# Patient Record
Sex: MALE | URBAN - METROPOLITAN AREA
[De-identification: names, ages, dates, MRNs, and addresses within clinical notes are randomized per-mention and may not be internally consistent; named-entity substitution may affect disease eponyms.]

---

## 2019-08-16 ENCOUNTER — HOSPITAL ENCOUNTER (INPATIENT)
Age: 24
LOS: 3 days | Discharge: HOME | DRG: 750 | End: 2019-08-19
Attending: PSYCHIATRY & NEUROLOGY | Admitting: PSYCHIATRY & NEUROLOGY
Payer: MEDICAID

## 2019-08-16 PROBLEM — F25.9 SCHIZOAFFECTIVE DISORDER (HCC): Status: ACTIVE | Noted: 2019-08-16

## 2019-08-16 PROCEDURE — 1240000000 HC EMOTIONAL WELLNESS R&B

## 2019-08-16 PROCEDURE — 90792 PSYCH DIAG EVAL W/MED SRVCS: CPT | Performed by: NURSE PRACTITIONER

## 2019-08-16 RX ORDER — MAGNESIUM HYDROXIDE/ALUMINUM HYDROXICE/SIMETHICONE 120; 1200; 1200 MG/30ML; MG/30ML; MG/30ML
30 SUSPENSION ORAL EVERY 6 HOURS PRN
Status: DISCONTINUED | OUTPATIENT
Start: 2019-08-16 | End: 2019-08-19 | Stop reason: HOSPADM

## 2019-08-16 RX ORDER — ARIPIPRAZOLE 20 MG/1
20 TABLET ORAL DAILY
Status: DISCONTINUED | OUTPATIENT
Start: 2019-08-16 | End: 2019-08-19 | Stop reason: HOSPADM

## 2019-08-16 RX ORDER — TRAZODONE HYDROCHLORIDE 50 MG/1
50 TABLET ORAL NIGHTLY PRN
Status: DISCONTINUED | OUTPATIENT
Start: 2019-08-16 | End: 2019-08-19 | Stop reason: HOSPADM

## 2019-08-16 RX ORDER — ACETAMINOPHEN 325 MG/1
650 TABLET ORAL EVERY 4 HOURS PRN
Status: DISCONTINUED | OUTPATIENT
Start: 2019-08-16 | End: 2019-08-19 | Stop reason: HOSPADM

## 2019-08-16 RX ORDER — BENZTROPINE MESYLATE 1 MG/ML
2 INJECTION INTRAMUSCULAR; INTRAVENOUS 2 TIMES DAILY PRN
Status: DISCONTINUED | OUTPATIENT
Start: 2019-08-16 | End: 2019-08-19 | Stop reason: HOSPADM

## 2019-08-16 ASSESSMENT — LIFESTYLE VARIABLES
HISTORY_ALCOHOL_USE: YES
HISTORY_ALCOHOL_USE: YES

## 2019-08-16 ASSESSMENT — PAIN SCALES - GENERAL: PAINLEVEL_OUTOF10: 0

## 2019-08-16 ASSESSMENT — SLEEP AND FATIGUE QUESTIONNAIRES
DO YOU USE A SLEEP AID: NO
DO YOU HAVE DIFFICULTY SLEEPING: NO
AVERAGE NUMBER OF SLEEP HOURS: 6

## 2019-08-16 ASSESSMENT — PATIENT HEALTH QUESTIONNAIRE - PHQ9: SUM OF ALL RESPONSES TO PHQ QUESTIONS 1-9: 20

## 2019-08-16 NOTE — GROUP NOTE
Group Therapy Note    Date: August 16    Group Start Time: 1330  Group End Time: 8594  Group Topic: Psychoeducation    MARIA R Jacobson, CTRS        Group Therapy Note    Attendees: 9         Patient's Goal:  To increase interpersonal interaction     Notes:  Patient attended group and participated     Status After Intervention:  Improved    Participation Level:  Active Listener and Interactive    Participation Quality: Appropriate, Attentive and Sharing      Speech:  normal      Thought Process/Content: Logical      Affective Functioning: Congruent      Mood: euthymic      Level of consciousness:  Alert and Oriented x4      Response to Learning: Progressing to goal      Endings: None Reported    Modes of Intervention: Education, Support and Socialization      Discipline Responsible: Psychoeducational Specialist      Signature:  Hanna Ovalles

## 2019-08-16 NOTE — PROGRESS NOTES
Patient given tobacco quitline number 32825177107 at this time, refusing to call at this time, states \" I just dont want to quit now\"- patient given information as to the dangers of long term tobacco use. Continue to reinforce the importance of tobacco cessation.

## 2019-08-16 NOTE — PLAN OF CARE
[facility]  Initial Interdisciplinary Treatment Plan NOTE    Original treatment plan Date & Time: 8/16/19 8492    Admission Type:  Admission Type: Involuntary    Reason for admission:   Reason for Admission: Suicidal ideations with multiple plans; history of attempts. Off medications and experiencing auditory hallucinations. Recent loss of mother    Estimated Length of Stay:  5-7days  Estimated Discharge Date:   to be determined by physician    PATIENT STRENGTHS:  Patient Strengths:Strengths: No significant Physical Illness  Patient Strengths and Limitations:Limitations: General negative or hopeless attitude about future/recovery  Addictive Behavior: Addictive Behavior  In the past 3 months, have you felt or has someone told you that you have a problem with:  : None  Do you have a history of Chemical Use?: No  Do you have a history of Alcohol Use?: Yes  Do you have a history of Street Drug Abuse?: (denies, K2 and marijuana per paperwork)  Histroy of Prescripton Drug Abuse?: No  Medical Problems:History reviewed. No pertinent past medical history.   Status EXAM:Status and Exam  Normal: No  Facial Expression: Avoids Gaze, Flat  Affect: Congruent  Level of Consciousness: Alert  Mood:Normal: No  Mood: Anxious, Labile, Irritable  Motor Activity:Normal: Yes  Interview Behavior: Evasive, Cooperative, Irritable  Attention:Normal: No  Attention: Unable to Concentrate  Thought Processes: Blocking  Thought Content:Normal: No  Thought Content: Preoccupations  Hallucinations: Unable to assess(patient refuses to answer)  Delusions: No(patient refuses to answer)  Memory:Normal: No(patient refuses to answer)  Insight and Judgment: No  Insight and Judgment: Poor Judgment, Poor Insight  Present Suicidal Ideation: Other(See comment)(patient refuses to answer)  Present Homicidal Ideation: Other(See comment)(patient refuses to answer)    EDUCATION:   Learner Progress Toward Treatment Goals:  Reviewed group plans and

## 2019-08-16 NOTE — GROUP NOTE
Group Therapy Note    Date: August 16    Group Start Time: 1000  Group End Time: 1057  Group Topic: Psychotherapy    MARIA R BURNETT    SUJIT Cadet, JENNIFERW        Group Therapy Note    Attendees: 3         Patient's Goal:  Pt will demonstrate increased interpersonal interaction. Notes:  Pt participated in group, but went on some tangents.     Status After Intervention:  Improved    Participation Level: Interactive    Participation Quality: Appropriate      Speech:  normal      Thought Process/Content: Logical      Affective Functioning: Congruent      Mood: elevated      Level of consciousness:  Alert      Response to Learning: Progressing to goal      Endings: None Reported    Modes of Intervention: Support      Discipline Responsible: /Counselor      Signature:  SUJIT Cadet, REED

## 2019-08-16 NOTE — BH NOTE
`[facility]  Admission Note     Admission Type:   Admission Type: Involuntary    Reason for admission:  Reason for Admission: Suicidal ideations with multiple plans; history of attempts. Off medications and experiencing auditory hallucinations. Recent loss of mother    PATIENT STRENGTHS:  Strengths: No significant Physical Illness    Patient Strengths and Limitations:  Limitations: General negative or hopeless attitude about future/recovery    Addictive Behavior:   Addictive Behavior  In the past 3 months, have you felt or has someone told you that you have a problem with:  : None  Do you have a history of Chemical Use?: No  Do you have a history of Alcohol Use?: Yes  Do you have a history of Street Drug Abuse?: (denies, K2 and marijuana per paperwork)  Histroy of Prescripton Drug Abuse?: No    Medical Problems:   History reviewed. No pertinent past medical history.     Status EXAM:  Status and Exam  Normal: No  Facial Expression: Avoids Gaze, Flat  Affect: Congruent  Level of Consciousness: Alert  Mood:Normal: No  Mood: Anxious, Labile, Irritable  Motor Activity:Normal: Yes  Interview Behavior: Evasive, Cooperative, Irritable  Attention:Normal: No  Attention: Unable to Concentrate  Thought Processes: Blocking  Thought Content:Normal: No  Thought Content: Preoccupations  Hallucinations: Unable to assess(patient refuses to answer)  Delusions: No(patient refuses to answer)  Memory:Normal: No(patient refuses to answer)  Insight and Judgment: No  Insight and Judgment: Poor Judgment, Poor Insight  Present Suicidal Ideation: Other(See comment)(patient refuses to answer)  Present Homicidal Ideation: Other(See comment)(patient refuses to answer)    Tobacco Screening:  Practical Counseling, on admission, eliza X, if applicable and completed (first 3 are required if patient doesn't refuse):            ( )  Recognizing danger situations (included triggers and roadblocks)                    ( )  Coping skills (new ways to manage stress, exercise, relaxation techniques, changing routine, distraction)                                                           ( )  Basic information about quitting (benefits of quitting, techniques in how to quit, available resources  ( ) Referral for counseling faxed to Michelle                                            (x ) Patient refused counseling  ( ) Patient has not smoked in the last 30 days    Metabolic Screening:    No results found for: LABA1C    No results found for: CHOL  No results found for: TRIG  No results found for: HDL  No components found for: LDLCAL  No results found for: LABVLDL      Body mass index is 37.56 kg/m². BP Readings from Last 2 Encounters:   08/16/19 127/71           Pt admitted with followings belongings:  Dentures: None  Vision - Corrective Lenses: None  Hearing Aid: None  Jewelry: None  Body Piercings Removed: N/A  Clothing: Footwear, Pants, Shirt, Socks  Were All Patient Medications Collected?: Not Applicable  Other Valuables: Cell phone, Other (Comment)(, headphones)     Valuables sent home with n/a. Valuables placed in safe in security envelope, number:  [id number]. Patient's home medications were n/a. Patient oriented to surroundings and program expectations and copy of patient rights given. Received admission packet:  yes. Consents reviewed, signed nothing. Refused everything. Patient verbalize understanding:  yes. Patient education on precautions: yes    Pt wanded and changed into hospital attire. Pt denies drug use, admits to ETOH use, does not specify amount; does deny any history of withdrawals. Pt refuses to answer most questions, states \"this place is stupid and I am pissed off since I can't have my hoodie. \"                   Shahbaz Presley RN

## 2019-08-16 NOTE — CARE COORDINATION
BHI Biopsychosocial Assessment    Current Level of Psychosocial Functioning     Independent XX  Dependent    Minimal Assist     Comments:    Psychosocial High Risk Factors (check all that apply)    Unable to obtain meds   Chronic illness/pain    Substance abuse XX  Lack of Family Support XX  Financial stress   Isolation XX  Inadequate Community Resources  Suicide attempt(s)  Not taking medications XX  Victim of crime   Developmental Delay  Unable to manage personal needs    Age 72 or older   Homeless  No transportation   Readmission within 30 days  Unemployment  Traumatic Event    Comments:   Psychiatric Advanced Directives: None reported     Family to [employer] in Treatment: declined     Sexual Orientation:  Heterosexual     Patient Strengths: stable income, housing, insurance, communication, no physical illness     Patient Barriers: poor insight, unmotivated       Opiate Education Provided:  VERENA       CMHC/mental health history: mariluz[facility]: [address] [facility]   medication management, group/individual therapies, family meetings, psycho -education, treatment team meetings to assist with stabilization    Initial Discharge Plan:  [address], [place] (current address with \"Rastafarian friends\"); link to [facility] in [place] (confirmed with [facility] he is eligible for services bc pt did not think he was) for continued med somatic. Clinical Summary:      23 yo male involuntary admission reporting SI with multiple plans; hx of attempts, off medications; AH and recent loss of mother, per admission note. Pt assessed on this date, poor recall, thought blocking/unable to focus but able to redirect; poor eye contact, endorsed hx of self harm and one suicide attempt at age 17. He reports his mother  May, 2019 and he relocated from [facility] to [place], [place] ~3 months ago. He informed writer, \"I'm about 9 or 10 days past the time for my shot. .. I just go to the hospitals to get them\".  He denied SI or plans, \"I could never do that\", but did report increased AH, decreased appetite and isolating from others. Pt states he is living at : [address] [facility] in [place], [place] with \"people from my Yazidism\" and will return at [place]. He reports alcohol, marijuana and K2 use, declined AOD referral and states \"I love doing drugs\". He endorsed the death of his mother and confirmed he recieves ? 1200/month as death benefits. He further reports his father was deported to the [facility] when he was 7 months old d/t \"hes schizophrenic and tried to kill me and my mom I guess\". He reports his mother was \"unfit\" to raise him and he spent 5+ years in residential treatment facilities. He reports he is [place] functioning autism\"/ He denied hx of abuse or trauma. He denied legal concerns. He agrees to link to [facility] in [place] at [facility] to continue med somatic.

## 2019-08-17 PROCEDURE — 1240000000 HC EMOTIONAL WELLNESS R&B

## 2019-08-17 PROCEDURE — 6370000000 HC RX 637 (ALT 250 FOR IP): Performed by: NURSE PRACTITIONER

## 2019-08-17 PROCEDURE — 6370000000 HC RX 637 (ALT 250 FOR IP): Performed by: REGISTERED NURSE

## 2019-08-17 PROCEDURE — 99232 SBSQ HOSP IP/OBS MODERATE 35: CPT | Performed by: NURSE PRACTITIONER

## 2019-08-17 PROCEDURE — 90833 PSYTX W PT W E/M 30 MIN: CPT | Performed by: NURSE PRACTITIONER

## 2019-08-17 RX ORDER — HYDROXYZINE HYDROCHLORIDE 25 MG/1
25 TABLET, FILM COATED ORAL 3 TIMES DAILY PRN
Status: DISCONTINUED | OUTPATIENT
Start: 2019-08-17 | End: 2019-08-19 | Stop reason: HOSPADM

## 2019-08-17 RX ADMIN — HYDROXYZINE HYDROCHLORIDE 25 MG: 25 TABLET, FILM COATED ORAL at 21:18

## 2019-08-17 RX ADMIN — ARIPIPRAZOLE 20 MG: 20 TABLET ORAL at 09:23

## 2019-08-17 RX ADMIN — HYDROXYZINE HYDROCHLORIDE 25 MG: 25 TABLET, FILM COATED ORAL at 15:02

## 2019-08-17 NOTE — GROUP NOTE
Group Therapy Note    Date: August 17    Group Start Time: 1330  Group End Time: 1430  Group Topic: Recreational    [facility]    REED Ford        Group Therapy Note    Attendees: 9/18         Patient's Goal:  Improve social skills. Status After Intervention:  Improved    Participation Level:  Active Listener and Interactive    Participation Quality: Appropriate, Attentive and Sharing      Speech:  normal      Thought Process/Content: Linear      Affective Functioning: Congruent      Mood: anxious      Level of consciousness:  Alert and Oriented x4      Response to Learning: Progressing to goal      Endings: None Reported    Modes of Intervention: Education, Support and Socialization      Discipline Responsible: /Counselor      Signature:  REED Ford

## 2019-08-17 NOTE — GROUP NOTE
Group Therapy Note    Date: August 17    Group Start Time: 1600  Group End Time: 1640  Group Topic: Healthy Living/Wellness    MARIA R Bautista RN        Group Therapy Note    Attendees: 11/17         Patient's Goal:  To learn about nutrition    Notes:  See below      Status After Intervention:  Improved    Participation Level:  Active Listener    Participation Quality: Attentive      Speech:  normal      Thought Process/Content: Logical      Affective Functioning: Congruent      Mood: Stable      Level of consciousness:  Alert      Response to Learning: Able to verbalize/acknowledge new learning      Endings: None Reported    Modes of Intervention: Education      Discipline Responsible: Registered Nurse      Signature:  Rosalba Bautista RN

## 2019-08-17 NOTE — PROGRESS NOTES
Department of Psychiatry  Nurse Practitioner Progress Note    Chief Complaint: Schizoaffective Disorder     SUBJECTIVE:  Amilcar Gaytan is a 22 y.o. male who was involuntarily admitted from  [place] with suicidal ideations. Patient has been off of his medications and is experiencing auditory hallucinations. He also reports recent loss of his mother as as factor to him wanting to die. Today patient is seen in treatment team and in the day room. He is observed standing up at the nursing desk conversing and joking with staff. He is brightened and friendly. In team, he is focused on discharge due to the fact that he is a  and nobody else can do his job while he is gone. He has significant thought blocking, is irritable and fidgety bordering on manic behavior. He stutters when he speaks. He refused his oral Abilify yesterday but did take his injection. He stated that we do not have his most current injection however; we were only able to verify a 6/11/19 administration. Writer educated patient regarding the necessity of oral medication for 14 days to increase plasma levels towards efficacy. He verbalized understanding and will take his oral Abilify as prescribed for 14 days. Patient stated that he came for admission in order to get back on his medications only. Due to the fact that patient will not be discharged today, Karis Wu became irritable but was able to maintain control. We will review discharge planning tomorrow. Chart and medications reviewed. Therapeutic support, empathetic care and psycho education provided greater than 20 minutes. At this time there is no safe alternative other than inpatient care.     OBJECTIVE    Physical  /78   Pulse 60   Temp 97.6 °F (36.4 °C) (Oral)   Resp 16   Ht 5' 8\" (1.727 m)   Wt 247 lb (112 kg)   BMI 37.56 kg/m²      Mental Status Evaluation:  Orientation: alertness: alert   Mood:. irritable      Affect:  flat and labile      Appearance:  age

## 2019-08-18 PROCEDURE — 6370000000 HC RX 637 (ALT 250 FOR IP): Performed by: NURSE PRACTITIONER

## 2019-08-18 PROCEDURE — 6370000000 HC RX 637 (ALT 250 FOR IP): Performed by: REGISTERED NURSE

## 2019-08-18 PROCEDURE — 1240000000 HC EMOTIONAL WELLNESS R&B

## 2019-08-18 PROCEDURE — 99232 SBSQ HOSP IP/OBS MODERATE 35: CPT | Performed by: NURSE PRACTITIONER

## 2019-08-18 RX ORDER — ARIPIPRAZOLE 20 MG/1
20 TABLET ORAL DAILY
Qty: 10 TABLET | Refills: 0 | Status: ON HOLD | OUTPATIENT
Start: 2019-08-19 | End: 2020-03-06

## 2019-08-18 RX ORDER — HYDROXYZINE HYDROCHLORIDE 25 MG/1
25 TABLET, FILM COATED ORAL 3 TIMES DAILY PRN
Qty: 42 TABLET | Refills: 0 | Status: SHIPPED | OUTPATIENT
Start: 2019-08-18 | End: 2019-09-01

## 2019-08-18 RX ORDER — TRAZODONE HYDROCHLORIDE 50 MG/1
50 TABLET ORAL NIGHTLY PRN
Qty: 14 TABLET | Refills: 0 | Status: ON HOLD | OUTPATIENT
Start: 2019-08-18 | End: 2020-03-06

## 2019-08-18 RX ADMIN — HYDROXYZINE HYDROCHLORIDE 25 MG: 25 TABLET, FILM COATED ORAL at 21:56

## 2019-08-18 RX ADMIN — ARIPIPRAZOLE 20 MG: 20 TABLET ORAL at 09:07

## 2019-08-18 NOTE — GROUP NOTE
Group Therapy Note    Date: August 18    Group Start Time: 0920  Group End Time: 0940  Group Topic: [address] [place], [place]        Group Therapy Note    Attendees: 9         Patient's Goal:  To increase interpersonal skills     Notes:  Patient attended group and participated     Status After Intervention:  Improved    Participation Level:  Active Listener and Interactive    Participation Quality: Appropriate, Attentive and Sharing      Speech:  normal      Thought Process/Content: Logical      Affective Functioning: Congruent      Mood: euthymic      Level of consciousness:  Alert and Oriented x4      Response to Learning: Progressing to goal      Endings: None Reported    Modes of Intervention: Education, Support and Socialization      Discipline Responsible: Psychoeducational Specialist      Signature:  Dawson Otrez

## 2019-08-18 NOTE — PROGRESS NOTES
Department of Psychiatry  Nurse Practitioner Progress Note    Chief Complaint: Schizoaffective Disorder     SUBJECTIVE:  Today patient is seen in the day room. He is observed socializing with others in the milieu and attending groups. He is brightened and friendly. He is more focused today and staff reports that he has not displayed manic behavior. Writer is not witnessing any behavior at this time. We discuss discharge and he verbalized readiness as well as a commitment to medication and follow-up compliance. Writer again educated patient regarding the necessity of oral medication after discharge in order to increase plasma levels towards efficacy. He verbalized understanding and will take his oral Abilify as prescribed. Patient stated that he came for admission in order to get back on his medications only and feels that he has achieved his goal. Chart and medications reviewed. Therapeutic support, empathetic care and psycho education provided. Discharge planning for 8/19/19. OBJECTIVE    Physical  BP (!) 145/88   Pulse 68   Temp 97.3 °F (36.3 °C) (Oral)   Resp 14   Ht 5' 8\" (1.727 m)   Wt 247 lb (112 kg)   BMI 37.56 kg/m²      Mental Status Evaluation:  Orientation: alertness: alert   Mood:. irritable      Affect:  flat and labile      Appearance:  age appropriate   Activity:  Restless & fidgety   Gait/Posture: Normal   Speech:  normal pitch and normal volume, stuttered   Thought Process:  circumstantial   Thought Content:  preoccupied   Sensorium:  person, place, time/date and situation   Cognition:  grossly intact   Memory: intact   Insight:  limited   Judgment: limited   Suicidal Ideations: denies suicidal ideation   Homicidal Ideations: Negative for homicidal ideation      Medication Side Effects: absent       Attention Span attention span appeared shorter than expected for age       Labs  No results found for this or any previous visit (from the past 67 hour(s)).     Medications  Current

## 2019-08-19 VITALS
WEIGHT: 247 LBS | DIASTOLIC BLOOD PRESSURE: 92 MMHG | TEMPERATURE: 97.1 F | RESPIRATION RATE: 14 BRPM | HEIGHT: 68 IN | HEART RATE: 62 BPM | BODY MASS INDEX: 37.44 KG/M2 | SYSTOLIC BLOOD PRESSURE: 136 MMHG

## 2019-08-19 PROBLEM — F25.9 SCHIZOAFFECTIVE DISORDER (HCC): Chronic | Status: ACTIVE | Noted: 2019-08-16

## 2019-08-19 PROCEDURE — 6370000000 HC RX 637 (ALT 250 FOR IP): Performed by: REGISTERED NURSE

## 2019-08-19 PROCEDURE — 6370000000 HC RX 637 (ALT 250 FOR IP): Performed by: NURSE PRACTITIONER

## 2019-08-19 PROCEDURE — 5130000000 HC BRIDGE APPOINTMENT

## 2019-08-19 PROCEDURE — 99239 HOSP IP/OBS DSCHRG MGMT >30: CPT | Performed by: NURSE PRACTITIONER

## 2019-08-19 RX ADMIN — ARIPIPRAZOLE 20 MG: 20 TABLET ORAL at 07:59

## 2019-08-19 RX ADMIN — HYDROXYZINE HYDROCHLORIDE 25 MG: 25 TABLET, FILM COATED ORAL at 08:23

## 2019-08-19 NOTE — PROGRESS NOTES
CLINICAL PHARMACY NOTE: MEDS TO [address] Select Patient?: No  Total # of Prescriptions Filled: 3    The following medications were delivered to the patient:  · Hydroxyzine  · Trazodone  · Abilify  ·   Total # of Interventions Completed: 0  Time Spent (min): 30    Additional Documentation:

## 2019-08-19 NOTE — CARE COORDINATION
Name: Jaycee Carreno    : 1995    Discharge Date: 2019    Primary Auth/Cert #: [id number]    Discharge Medications:      Medication List      START taking these medications    * ARIPiprazole 20 MG tablet  Commonly known as:  ABILIFY  Take 1 tablet by mouth daily for 10 days  Notes to patient:  Clear thoughts     * ARIPiprazole  MG Srer  Commonly known as:  ABILIFY MAINTENA  Inject 400 mg into the muscle every 28 days  Start taking on:  2019  Notes to patient:  Long acting     hydrOXYzine 25 MG tablet  Commonly known as:  ATARAX  Take 1 tablet by mouth 3 times daily as needed for Anxiety  Notes to patient:  anxiety     traZODone 50 MG tablet  Commonly known as:  DESYREL  Take 1 tablet by mouth nightly as needed for Sleep  Notes to patient:  sleep         * This list has 2 medication(s) that are the same as other medications prescribed for you. Read the directions carefully, and ask your doctor or other care provider to review them with you. Where to Get Your Medications      These medications were sent to [address] [address]    Phone:  [phone]   · ARIPiprazole 20 MG tablet  · hydrOXYzine 25 MG tablet  · traZODone 50 MG tablet     Information about where to get these medications is not yet available    Ask your nurse or doctor about these medications  · ARIPiprazole  MG Srer         Follow Up Appointment: [address] [place] [address]  X) [phone] (g) [phone]    On 2019  Case Management @ 10:30    [address]  O) [phone] (A) [phone]  On 2019  Therapy @ 8:30am for intake and assessment.  BRING PHOTO ID, INSURANCE CARD AND PROOF OF INCOME    [facility]  Inga Schmid[address]  (P) [phone]  (P) [phone]  On 9/3/2019  for medication management @2:45 Dr. Leonel Odonnell.

## 2019-08-19 NOTE — GROUP NOTE
Group Therapy Note    Date: August 18    Group Start Time: 2030  Group End Time: 2056  Group Topic: Wrap-Up    MARIA R Plata        Group Therapy Note    Attendees: 9/18         Patient's Goal:  Discuss discharge plans    Notes:      Status After Intervention:  Unchanged    Participation Level: Active Listener    Participation Quality: Attentive      Speech:  normal      Thought Process/Content: Logical      Affective Functioning: Congruent      Mood: anxious      Level of consciousness:  Alert      Response to Learning: Progressing to goal      Endings: None Reported    Modes of Intervention: Support      Discipline Responsible: Behavorial Health Tech      Signature:   Dirk Patrick

## 2019-08-19 NOTE — GROUP NOTE
Group Therapy Note    Date: August 19    Group Start Time: 0900  Group End Time: 0920  Group Topic: [address]    Patient refused to attend [employer] at 0900 after encouragement from staff. 1:1 talk time offered.       Signature:  Cas Adkins

## 2019-08-19 NOTE — GROUP NOTE
Group Therapy Note    Date: August 19    Group Start Time: 1100  Group End Time: 1140  Group Topic: Recreational    [facility]    JANIYA Kaba    Patient's Goal:  Improve concentration and focus, demonstrate increased interpersonal interaction     Notes:  Pt attended group and participated in activity for 40 minutes. Pt interacted with staff and peers. Status After Intervention:  Improved    Participation Level:  Active Listener and Interactive    Participation Quality: Appropriate, Attentive, Sharing and Supportive    Speech:  normal    Thought Process/Content: Logical    Affective Functioning: Congruent    Mood: euthymic    Level of consciousness:  Alert, Oriented x4 and Attentive    Response to Learning: Able to verbalize current knowledge/experience, Able to verbalize/acknowledge new learning, Able to retain information, Capable of insight and Progressing to goal    Endings: None Reported    Modes of Intervention: Education, Socialization, Exploration, Problem-solving, Activity and Limit-setting    Discipline Responsible: Psychoeducational Specialist    Signature:  Leni Kaba

## 2019-08-19 NOTE — PLAN OF CARE
Problem: Depressive Behavior With or Without Suicide Precautions:  Goal: Able to verbalize and/or display a decrease in depressive symptoms  Description  LTG Able to verbalize and/or display a decrease in depressive symptoms   Note:   Pt states he is feeling better and feels he is getting ready for discharge pt states he will continue to follow up at [facility] on discharge. Problem: Depressive Behavior With or Without Suicide Precautions:  Goal: Absence of self-harm  Description  STG Absence of self-harm   Note:   Pt denies thoughts of self harm and is agreeable to seeking out should thoughts of self harm arise. Safe environment maintained. Q15 minute checks for safety cont per unit policy. Will cont to monitor for safety and provides support and reassurance as needed. PT isolative to his room most of shift. PT states he is still tried from being up so late the night of admission. PT is calm and cooperative with staff during one to one time.

## 2020-03-06 ENCOUNTER — HOSPITAL ENCOUNTER (INPATIENT)
Age: 25
LOS: 3 days | Discharge: HOME | DRG: 750 | End: 2020-03-09
Attending: PSYCHIATRY & NEUROLOGY | Admitting: PSYCHIATRY & NEUROLOGY
Payer: MEDICAID

## 2020-03-06 PROBLEM — F20.9 SCHIZOPHRENIA (HCC): Status: ACTIVE | Noted: 2020-03-06

## 2020-03-06 PROCEDURE — 1240000000 HC EMOTIONAL WELLNESS R&B

## 2020-03-06 PROCEDURE — 90792 PSYCH DIAG EVAL W/MED SRVCS: CPT | Performed by: PSYCHIATRY & NEUROLOGY

## 2020-03-06 PROCEDURE — 6370000000 HC RX 637 (ALT 250 FOR IP): Performed by: NURSE PRACTITIONER

## 2020-03-06 RX ORDER — NICOTINE 21 MG/24HR
1 PATCH, TRANSDERMAL 24 HOURS TRANSDERMAL DAILY
Status: DISCONTINUED | OUTPATIENT
Start: 2020-03-06 | End: 2020-03-06 | Stop reason: ALTCHOICE

## 2020-03-06 RX ORDER — MAGNESIUM HYDROXIDE/ALUMINUM HYDROXICE/SIMETHICONE 120; 1200; 1200 MG/30ML; MG/30ML; MG/30ML
30 SUSPENSION ORAL EVERY 6 HOURS PRN
Status: DISCONTINUED | OUTPATIENT
Start: 2020-03-06 | End: 2020-03-09 | Stop reason: HOSPADM

## 2020-03-06 RX ORDER — ACETAMINOPHEN 325 MG/1
650 TABLET ORAL EVERY 4 HOURS PRN
Status: DISCONTINUED | OUTPATIENT
Start: 2020-03-06 | End: 2020-03-09 | Stop reason: HOSPADM

## 2020-03-06 RX ORDER — PRAZOSIN HYDROCHLORIDE 1 MG/1
1 CAPSULE ORAL NIGHTLY
Status: DISCONTINUED | OUTPATIENT
Start: 2020-03-06 | End: 2020-03-09 | Stop reason: HOSPADM

## 2020-03-06 RX ORDER — PRAZOSIN HYDROCHLORIDE 1 MG/1
1 CAPSULE ORAL NIGHTLY
Status: ON HOLD | COMMUNITY
End: 2020-03-09 | Stop reason: SDUPTHER

## 2020-03-06 RX ORDER — HYDROXYZINE HYDROCHLORIDE 25 MG/1
25 TABLET, FILM COATED ORAL 3 TIMES DAILY PRN
Status: DISCONTINUED | OUTPATIENT
Start: 2020-03-06 | End: 2020-03-09 | Stop reason: HOSPADM

## 2020-03-06 RX ORDER — UREA 10 %
1 LOTION (ML) TOPICAL NIGHTLY
COMMUNITY
End: 2020-04-21 | Stop reason: SDUPTHER

## 2020-03-06 RX ORDER — TOPIRAMATE 50 MG/1
50 TABLET, FILM COATED ORAL 2 TIMES DAILY
COMMUNITY
End: 2020-04-21 | Stop reason: SDUPTHER

## 2020-03-06 RX ORDER — TOPIRAMATE 50 MG/1
50 TABLET, FILM COATED ORAL 2 TIMES DAILY
Status: DISCONTINUED | OUTPATIENT
Start: 2020-03-06 | End: 2020-03-09 | Stop reason: HOSPADM

## 2020-03-06 RX ORDER — BENZTROPINE MESYLATE 1 MG/ML
2 INJECTION INTRAMUSCULAR; INTRAVENOUS 2 TIMES DAILY PRN
Status: DISCONTINUED | OUTPATIENT
Start: 2020-03-06 | End: 2020-03-09 | Stop reason: HOSPADM

## 2020-03-06 RX ORDER — DULOXETIN HYDROCHLORIDE 30 MG/1
30 CAPSULE, DELAYED RELEASE ORAL DAILY
Status: DISCONTINUED | OUTPATIENT
Start: 2020-03-06 | End: 2020-03-09 | Stop reason: HOSPADM

## 2020-03-06 RX ORDER — DULOXETIN HYDROCHLORIDE 30 MG/1
30 CAPSULE, DELAYED RELEASE ORAL DAILY
Status: ON HOLD | COMMUNITY
End: 2020-03-09 | Stop reason: SDUPTHER

## 2020-03-06 RX ORDER — TRAZODONE HYDROCHLORIDE 50 MG/1
50 TABLET ORAL NIGHTLY PRN
Status: DISCONTINUED | OUTPATIENT
Start: 2020-03-06 | End: 2020-03-09 | Stop reason: HOSPADM

## 2020-03-06 RX ADMIN — ACETAMINOPHEN 650 MG: 325 TABLET, FILM COATED ORAL at 21:42

## 2020-03-06 RX ADMIN — Medication 1 MG: at 21:40

## 2020-03-06 RX ADMIN — TOPIRAMATE 50 MG: 50 TABLET, FILM COATED ORAL at 21:39

## 2020-03-06 RX ADMIN — DULOXETINE HYDROCHLORIDE 30 MG: 30 CAPSULE, DELAYED RELEASE ORAL at 16:41

## 2020-03-06 RX ADMIN — HYDROXYZINE HYDROCHLORIDE 25 MG: 25 TABLET, FILM COATED ORAL at 22:52

## 2020-03-06 RX ADMIN — PRAZOSIN HYDROCHLORIDE 1 MG: 1 CAPSULE ORAL at 21:40

## 2020-03-06 ASSESSMENT — PAIN SCALES - GENERAL
PAINLEVEL_OUTOF10: 0
PAINLEVEL_OUTOF10: 3

## 2020-03-06 ASSESSMENT — SLEEP AND FATIGUE QUESTIONNAIRES
DO YOU HAVE DIFFICULTY SLEEPING: YES
DIFFICULTY STAYING ASLEEP: YES
DIFFICULTY ARISING: NO
DIFFICULTY FALLING ASLEEP: NO
DO YOU USE A SLEEP AID: NO
AVERAGE NUMBER OF SLEEP HOURS: 6
DIFFICULTY FALLING ASLEEP: NO
DO YOU HAVE DIFFICULTY SLEEPING: YES
RESTFUL SLEEP: YES
DIFFICULTY ARISING: NO
DIFFICULTY STAYING ASLEEP: YES
RESTFUL SLEEP: YES
SLEEP PATTERN: DISTURBED/INTERRUPTED SLEEP
DO YOU USE A SLEEP AID: NO
AVERAGE NUMBER OF SLEEP HOURS: 6
SLEEP PATTERN: DISTURBED/INTERRUPTED SLEEP;EARLY AWAKENING

## 2020-03-06 ASSESSMENT — LIFESTYLE VARIABLES
HISTORY_ALCOHOL_USE: YES
HISTORY_ALCOHOL_USE: YES

## 2020-03-06 ASSESSMENT — PAIN - FUNCTIONAL ASSESSMENT: PAIN_FUNCTIONAL_ASSESSMENT: 0-10

## 2020-03-06 NOTE — BH NOTE
Patient given tobacco quitline number 84181709277 at this time, refusing to call at this time, states \" I just dont want to quit now\"- patient given information as to the dangers of long term tobacco use. Continue to reinforce the importance of tobacco cessation.

## 2020-03-06 NOTE — GROUP NOTE
Group Therapy Note    Date: 3/6/2020    Group Start Time: 1430  Group End Time: 8185  Group Topic: Recreational    ST JANIYA Segundo        Group Therapy Note    Attendees:6/16    patient refused to attend recreation and leisure group at [id number] after encouragement from staff. 1:1 talk time provided as alternative to group session.            Signature:  Harshad Andre [place]

## 2020-03-06 NOTE — BH NOTE
Psychiatric Admission Note         Austin Green is a 22 y.o. male who was admitted to psychiatric unit. He was drinking and was having suicidal thoughts and he was planning to overdose on his medication and die. He is feeling that people are trying to harm him and hurt him. He is experiencing auditory hallucinations telling him about several things. He complains of agitation anxiety and mood swings. He complains of lack of energy and motivation. Past psychiatric history of schizoaffective disorder. He denies any drug and alcohol use. Medical and surgical history:    He denies any acute or chronic medical problems. He denies any allergies. He said that he has 2 brothers and 1 sister. He lives with his mother. He does not know his father. He has no children. He gets Social Security disability. He did not complete his high school. He denies any legal problems. He denies any physical sexual or emotional abuse in him. He denies any family history of mental illness suicidal drug and alcohol abuse. Past Psychiatric History   Patient Reports noncompliance with outpatient psychiatric care. Reported history of psychiatric inpatient hospitalizations. Reported history of suicide attempts. History of Substance Abuse     Denies alcohol use or use of any illicit drugs. Family History of psychiatric disorders    Family history: denied       Medical History   Allergies:  Patient has no known allergies. Past Medical History:   Diagnosis Date    ADHD     [facility]     Substance abuse ([facility]       History reviewed. No pertinent surgical history. Labs  No results found for this or any previous visit (from the past 72 hour(s)).     SOCIAL HISTORY  Social History     Socioeconomic History    Marital status: Unknown     Spouse name: Not on file    Number of children: Not on file    Years of education: Not on file    Highest

## 2020-03-06 NOTE — BH NOTE
`[facility]  Admission Note     Admission Type:   Admission Type:  Involuntary(signed in)    Reason for admission:  Reason for Admission: got drunk last night and had thoughts to harm self, took sleeping pill with alcohol    PATIENT STRENGTHS:  Strengths: Medication Compliance, Connection to output provider, No significant Physical Illness    Patient Strengths and Limitations:  Limitations: Inappropriate/potentially harmful leisure interests    Addictive Behavior:   Addictive Behavior  In the past 3 months, have you felt or has someone told you that you have a problem with:  : None  Do you have a history of Chemical Use?: No  Do you have a history of Alcohol Use?: Yes  Do you have a history of Street Drug Abuse?: No  Histroy of Prescripton Drug Abuse?: No    Medical Problems:   Past Medical History:   Diagnosis Date    ADHD     [facility]ive [facility] ([facility] [facility]     Substance abuse ([facility]        Status EXAM:  Status and Exam  Normal: No  Facial Expression: Avoids Gaze, Flat  Affect: Appropriate  Level of Consciousness: Alert  Mood:Normal: No  Mood: Depressed, Sad  Motor Activity:Normal: Yes  Interview Behavior: Cooperative, Evasive, Impulsive  Preception: [place] to Person, Michaelene Boy to Time, [place] to Place, [place] to Situation  Attention:Normal: No  Attention: Distractible  Thought Processes: Blocking  Thought Content:Normal: No  Thought Content: Poverty of Content  Hallucinations: None  Delusions: No  Memory:Normal: Yes  Insight and Judgment: No  Insight and Judgment: Poor Judgment, Poor Insight  Present Suicidal Ideation: No  Present Homicidal Ideation: No    Tobacco Screening:  Practical Counseling, on admission, eliza X, if applicable and completed (first 3 are required if patient doesn't refuse):            (X )  Recognizing danger situations (included triggers and roadblocks)                    (X )  Coping skills (new ways to manage stress, exercise, relaxation techniques, changing routine, process, but did request to keep his shoes without laces to prevent falls, doctors order will be obtained. Patient safety maintained.   Marv Short RN

## 2020-03-06 NOTE — GROUP NOTE
Group Therapy Note    Date: 3/6/2020    Group Start Time: 1330  Group End Time: 1430  Group Topic: Recreational    [address], [place]        Group Therapy Note    Attendees: 5    Pt did not attend Therapeutic Recreation d/t resting in room despite staff invitation to attend. 1:1 talk time offered as alternative to group session, pt declined.

## 2020-03-06 NOTE — PLAN OF CARE
[facility]  Initial Interdisciplinary Treatment Plan NO      Original treatment plan Date & Time: 3/6/20    Admission Type:  Admission Type:  Involuntary(signed in)    Reason for admission:   Reason for Admission: got drunk last night and had thoughts to harm self, took sleeping pill with alcohol    Estimated Length of Stay:  5-7days  Estimated Discharge Date: to be determined by physician    PATIENT STRENGTHS:  Patient Strengths:Strengths: Medication Compliance, Connection to output provider, No significant Physical Illness, Communication, Positive Support  Patient Strengths and Limitations:Limitations: Multiple barriers to leisure interests  Addictive Behavior: Addictive Behavior  In the past 3 months, have you felt or has someone told you that you have a problem with:  : None  Do you have a history of Chemical Use?: No  Do you have a history of Alcohol Use?: Yes  Do you have a history of Street Drug Abuse?: Yes  Histroy of Prescripton Drug Abuse?: No  Medical Problems:  Past Medical History:   Diagnosis Date    ADHD     Schizoaffective disorder ([facility]     Substance abuse ([facility]      Status EXAM:Status and Exam  Normal: No  Facial Expression: Avoids Gaze, Flat  Affect: Blunt  Level of Consciousness: Alert  Mood:Normal: No  Mood: Anxious  Motor Activity:Normal: Yes  Interview Behavior: Cooperative, Evasive, Impulsive  Preception: [place] to Person, Soren Arias to Time, [place] to Situation, [place] to Place  Attention:Normal: No  Attention: Distractible  Thought Processes: Blocking  Thought Content:Normal: Yes  Thought Content: Poverty of Content  Hallucinations: None  Delusions: No  Memory:Normal: Yes  Insight and Judgment: No  Insight and Judgment: Poor Judgment, Poor Insight  Present Suicidal Ideation: No  Present Homicidal Ideation: No    EDUCATION:   Learner Progress Toward Treatment Goals: reviewed group plans and strategies for care    Method:group therapy, medication compliance, individualized assessments and care planning    Outcome: needs reinforcement    PATIENT GOALS: to be discussed with patient within 72 hours    PLAN/TREATMENT RECOMMENDATIONS:     continue group therapy , medications compliance, goal setting, individualized assessments and care, continue to monitor pt on unit      SHORT-TERM GOALS:   Time frame for Short-Term Goals: 5-7 days    LONG-TERM GOALS:  Time frame for Long-Term Goals: 6 months  Members Present in Team Meeting: See Signature Sheet    Tor Arora, [address]

## 2020-03-07 PROCEDURE — 1240000000 HC EMOTIONAL WELLNESS R&B

## 2020-03-07 PROCEDURE — 6370000000 HC RX 637 (ALT 250 FOR IP): Performed by: NURSE PRACTITIONER

## 2020-03-07 PROCEDURE — 99232 SBSQ HOSP IP/OBS MODERATE 35: CPT | Performed by: NURSE PRACTITIONER

## 2020-03-07 PROCEDURE — 99252 IP/OBS CONSLTJ NEW/EST SF 35: CPT | Performed by: NURSE PRACTITIONER

## 2020-03-07 RX ADMIN — Medication 1 MG: at 21:13

## 2020-03-07 RX ADMIN — TOPIRAMATE 50 MG: 50 TABLET, FILM COATED ORAL at 08:02

## 2020-03-07 RX ADMIN — HYDROXYZINE HYDROCHLORIDE 25 MG: 25 TABLET, FILM COATED ORAL at 21:13

## 2020-03-07 RX ADMIN — TOPIRAMATE 50 MG: 50 TABLET, FILM COATED ORAL at 21:13

## 2020-03-07 RX ADMIN — PRAZOSIN HYDROCHLORIDE 1 MG: 1 CAPSULE ORAL at 21:13

## 2020-03-07 RX ADMIN — DULOXETINE HYDROCHLORIDE 30 MG: 30 CAPSULE, DELAYED RELEASE ORAL at 08:02

## 2020-03-07 RX ADMIN — ACETAMINOPHEN 650 MG: 325 TABLET, FILM COATED ORAL at 16:21

## 2020-03-07 ASSESSMENT — ENCOUNTER SYMPTOMS
TROUBLE SWALLOWING: 0
COUGH: 0
CONSTIPATION: 0
VOMITING: 0
SHORTNESS OF BREATH: 0
DIARRHEA: 0
ABDOMINAL PAIN: 0
WHEEZING: 0
BACK PAIN: 0
NAUSEA: 0

## 2020-03-07 ASSESSMENT — PAIN SCALES - GENERAL
PAINLEVEL_OUTOF10: 5
PAINLEVEL_OUTOF10: 0
PAINLEVEL_OUTOF10: 3

## 2020-03-07 ASSESSMENT — PAIN DESCRIPTION - LOCATION: LOCATION: HEAD

## 2020-03-07 NOTE — PROGRESS NOTES
Department of Psychiatry  Attending Progress Note  Chief Complaint: Schizoaffective disorder ([facility] [address] [facility]     SUBJECTIVE:  Yves Alvarez is seen today during treatment team.  He sates he wants to be discharged because he has a court case in [place] on Friday. He displays thought blocking. He is guarded when answering questions and does not maintain eye contact. He states he does not like groups but will go so he can \"do what is needed to leave\"  He reports he has been sober prior to drinking before he was admitted. He states he is sleeping OK. He denies AI/HI/AVH. He appears to be responding to internal stimuli. Patient has limited insight and poor judgement. There is no safe alternative at this time than continued hospitalization. OBJECTIVE    Physical  BP (!) 141/68   Pulse 87   Temp 97.7 °F (36.5 °C) (Oral)   Resp 14   Ht 5' 8\" (1.727 m)   Wt 262 lb (118.8 kg)   BMI 39.84 kg/m²      Mental Status Evaluation:  Orientation: alertness: alert   Mood:. constricted      Affect:  labile      Appearance:  disheveled   Activity:  Within Normal Limits   Gait/Posture: Normal   Speech:  normal pitch and normal volume   Thought Process:  blocked   Thought Content:  hallucinations   Sensorium:  person, place, time/date and situation   Cognition:  grossly intact   Memory: intact   Insight:  limited   Judgment: limited   Suicidal Ideations: denies suicidal ideation   Homicidal Ideations: Negative for homicidal ideation      Medication Side Effects: absent       Attention Span attention span appeared shorter than expected for age       Labs  No results found for this or any previous visit (from the past 67 hour(s)).     Medications  Current Facility-Administered Medications   Medication Dose Route Frequency Provider Last Rate Last Dose    DULoxetine (CYMBALTA) extended release capsule 30 mg  30 mg Oral Daily FOSTER Ellington CNP   30 mg at 03/07/20 0802    melatonin ER tablet 1 mg  1 mg Oral Nightly Jordin Height FOSTER Gardner CNP   1 mg at 03/06/20 2140    prazosin (MINIPRESS) capsule 1 mg  1 mg Oral Nightly FOSTER Neal CNP   1 mg at 03/06/20 2140    topiramate (TOPAMAX) tablet 50 mg  50 mg Oral BID FOSTER Neal CNP   50 mg at 03/07/20 0802    acetaminophen (TYLENOL) tablet 650 mg  650 mg Oral Q4H PRN FOSTER Neal CNP   650 mg at 03/06/20 2142    traZODone (DESYREL) tablet 50 mg  50 mg Oral Nightly PRN FOSTER Neal CNP        benztropine mesylate (COGENTIN) injection 2 mg  2 mg Intramuscular BID PRN FOSTER Neal CNP        magnesium hydroxide (MILK OF MAGNESIA) 400 MG/5ML suspension 30 mL  30 mL Oral Daily PRN FOSTER Neal CNP        aluminum & magnesium hydroxide-simethicone (MAALOX) 200-200-20 MG/5ML suspension 30 mL  30 mL Oral Q6H PRN FOSTER Neal CNP        hydrOXYzine (ATARAX) tablet 25 mg  25 mg Oral TID PRN FOSTER Neal CNP   25 mg at 03/06/20 2252    nicotine polacrilex (NICORETTE) gum 2 mg  2 mg Oral PRN Harman Joyce MD             DULoxetine  30 mg Oral Daily    melatonin ER  1 mg Oral Nightly    prazosin  1 mg Oral Nightly    topiramate  50 mg Oral BID       ASSESSMENT  Schizoaffective disorder (HCC)     Patient's Response to Treatment: positive    PLAN  1. Continue inpatient hospitalization  2. Medication management  3. Participation in groups and therapeutic milieu  4. Social work for discharge planning. Electronically signed by FOSTER Ziegler CNP on 3/7/2020 at 12:15 PM.    Dragon voice recognition software used in portions of this document.

## 2020-03-07 NOTE — PLAN OF CARE
Problem: Altered Mood, Depressive Behavior:  Goal: Absence of self-harm  Description  Absence of self-harm  Outcome: Met This Shift     Problem: Altered Mood, Depressive Behavior:  Goal: Able to verbalize and/or display a decrease in depressive symptoms  Description  Able to verbalize and/or display a decrease in depressive symptoms  Outcome: Ongoing   Patient relates that he is feeling better than when he came in. He feels that his problems are less severe than a lot of people after experiencing his stay here. He was active and social in the milieu and in group. Has some immature and inappropriate behavior in group. He denies any self-harm thoughts or intentions.

## 2020-03-07 NOTE — GROUP NOTE
Group Therapy Note    Date: 3/7/2020    Group Start Time: 0900  Group End Time: 1363  Group Topic: Community Meeting    MARIA R PEMBERTON    JANIYA Paredes    Group Therapy Note    Attendees: 8    Patient's Goal:  Pt will identify one daily goal and verbalize understanding of unit guidelines    Notes:  Pt attended group and participated    Status After Intervention:  Improved    Participation Level: Interactive, Monopolizing    Participation Quality: Appropriate, Attentive, Sharing      Speech:  normal      Thought Process/Content: Logical      Affective Functioning: Congruent      Mood: euthymic      Level of consciousness:  Alert, Oriented x4 and Attentive      Response to Learning: Able to verbalize current knowledge/experience, Able to verbalize/acknowledge new learning, Able to retain information, Capable of insight, Able to change behavior and Progressing to goal      Endings: None Reported    Modes of Intervention: Education, Support, Socialization, Exploration, Activity and Limit-setting      Discipline Responsible: Psychoeducational Specialist      Signature:  JANIYA Paredes

## 2020-03-07 NOTE — GROUP NOTE
Group Therapy Note    Date: 3/7/2020    Group Start Time: 1000  Group End Time: 1497  Group Topic: Psychoeducation    MARIA R PEMBERTON    SUJIT Ruby LSW        Group Therapy Note    Attendees: 9/18      Patient's Goal:  Increase socialization and coping skills    Notes:  Pt is making progress AEB participating in group activity. Status After Intervention:  Improved    Participation Level:  Active Listener and Interactive    Participation Quality: Appropriate, Attentive and Supportive      Speech:  normal      Thought Process/Content: Logical      Affective Functioning: Congruent      Mood: euthymic      Level of consciousness:  Alert, Oriented x4 and Attentive      Response to Learning: Progressing to goal      Endings: None Reported    Modes of Intervention: Education, Support, Socialization, Exploration, Clarifying and Problem-solving      Discipline Responsible: /Counselor      Signature:  SUJIT Ruby LSW, [place]

## 2020-03-07 NOTE — PLAN OF CARE
5 [facility]  Day 3 Interdisciplinary Treatment Plan NOTE    Review Date & Time: 3/7/2020 1104    Admission Type:   Admission Type:  Involuntary(signed in)    Reason for admission:  Reason for Admission: got drunk last night and had thoughts to harm self, took sleeping pill with alcohol  Estimated Length of Stay:  5-7 days  Estimated Discharge Date Update: to be determined by physician    PATIENT STRENGTHS:  Patient Strengths:Strengths: Medication Compliance, Connection to output provider, No significant Physical Illness, Communication, Positive Support  Patient Strengths and Limitations:Limitations: Multiple barriers to leisure interests  Addictive Behavior:Addictive Behavior  In the past 3 months, have you felt or has someone told you that you have a problem with:  : None  Do you have a history of Chemical Use?: No  Do you have a history of Alcohol Use?: Yes  Do you have a history of Street Drug Abuse?: Yes  Histroy of Prescripton Drug Abuse?: No  Medical Problems:  Past Medical History:   Diagnosis Date    ADHD     Schizoaffective disorder ([facility] [address]     Substance abuse ([facility]        Risk:  Fall RiskTotal: 61  Ag Scale Ag Scale Score: 22  BVC Total: 0  Change in scores:  No Changes to plan of Care:  No    Status EXAM:   Status and Exam  Normal: Yes  Facial Expression: Brightened  Affect: Congruent  Level of Consciousness: Alert  Mood:Normal: Yes  Mood: Depressed, Anxious  Motor Activity:Normal: Yes  Interview Behavior: Cooperative  Preception: [place] to Person, [place] to Time, [place] to Place, [place] to Situation  Attention:Normal: No  Attention: Distractible  Thought Processes: Circumstantial  Thought Content:Normal: No  Thought Content: Poverty of Content  Hallucinations: None  Delusions: No  Memory:Normal: Yes  Insight and Judgment: No  Insight and Judgment: Poor Judgment  Present Suicidal Ideation: No  Present Homicidal Ideation: No    Daily Assessment Last Entry:   Daily Sleep (WDL): Within Defined Limits         Patient Currently in Pain: Yes  Daily Nutrition (WDL): Within Defined Limits    Patient Monitoring:  Frequency of Checks: 4 times per hour, close    Psychiatric Symptoms:   Depression Symptoms  Depression Symptoms: No problems reported or observed. Anxiety Symptoms  Anxiety Symptoms: No problems reported or observed. Bianca Symptoms  Bianca Symptoms: No problems reported or observed. Psychosis Symptoms  Delusion Type: No problems reported or observed. Suicide Risk CSSR-S:  1) Within the past month, have you wished you were dead or wished you could go to sleep and not wake up? : Yes  2) Have you actually had any thoughts of killing yourself? : Yes  3) Have you been thinking about how you might kill yourself? : Yes  5) Have you started to work out or worked out the details of how to kill yourself? Do you intend to carry out this plan? : No  6) Have you ever done anything, started to do anything, or prepared to do anything to end your life?: No  Change in Result: Change in Plan of care:      EDUCATION:   Learner Progress Toward Treatment Goals: Reviewed results and recommendations of this team, Reviewed group plan and strategies, Reviewed signs, symptoms and risk of self harm and violent behavior, Reviewed goals and plan of care    Method:  small group, individual verbal education    Outcome: Verbalized by patient but needs reinforcement to obtain goals    PATIENT GOALS:  Short term:  Plan for discharge  Long term:   Follow up with Shayne Libman    PLAN/TREATMENT RECOMMENDATIONS UPDATE:  continue with group therapies, increased socialization, continue planning for after discharge goals, continue with medication compliance    SHORT-TERM GOALS UPDATE:   Time frame for Short-Term Goals:  5-7 days    LONG-TERM GOALS UPDATE:   Time frame for Long-Term Goals:  6 months    Members Present in Team Meeting:   See signature sheet  Lucho Kirby

## 2020-03-07 NOTE — GROUP NOTE
Group Therapy Note    Date: 3/7/2020    Group Start Time: 1100  Group End Time: 1140  Group Topic: Healthy Living/Wellness    MARIA R Sears, MICAH        Group Therapy Note    Attendees: 8/10         Patient's Goal:  To discuss values    Notes:  See below    Status After Intervention:  Unchanged    Participation Level:  Active Listener    Participation Quality: Sharing      Speech:  normal      Thought Process/Content: Logical      Affective Functioning: Congruent      Mood: WNL      Level of consciousness:  Oriented x4      Response to Learning: Able to verbalize current knowledge/experience      Endings: None Reported    Modes of Intervention: Support      Discipline Responsible: Registered Nurse

## 2020-03-07 NOTE — GROUP NOTE
Group Therapy Note    Date: 3/6/2020    Group Start Time: 2100  Group End Time: 2130  Group Topic: Wrap-Up    MARIA R Mitchell        Group Therapy Note    Attendees: 10/17 patients for positivity focus outlook group     Elsa Dawn actively participated in group discussion today. He shared that he is happy that is he is not sleeping all day. Grateful his problems are not as bad as others. Death of mother is presently on his mind. She passed two years ago but she had previously lost parental rights for him. Focused and positive in group.         Signature:  Shelly Jefferson

## 2020-03-08 PROCEDURE — 1240000000 HC EMOTIONAL WELLNESS R&B

## 2020-03-08 PROCEDURE — 6370000000 HC RX 637 (ALT 250 FOR IP): Performed by: NURSE PRACTITIONER

## 2020-03-08 PROCEDURE — 99232 SBSQ HOSP IP/OBS MODERATE 35: CPT | Performed by: PSYCHIATRY & NEUROLOGY

## 2020-03-08 RX ADMIN — ACETAMINOPHEN 650 MG: 325 TABLET, FILM COATED ORAL at 11:44

## 2020-03-08 RX ADMIN — PRAZOSIN HYDROCHLORIDE 1 MG: 1 CAPSULE ORAL at 22:05

## 2020-03-08 RX ADMIN — DULOXETINE HYDROCHLORIDE 30 MG: 30 CAPSULE, DELAYED RELEASE ORAL at 09:00

## 2020-03-08 RX ADMIN — HYDROXYZINE HYDROCHLORIDE 25 MG: 25 TABLET, FILM COATED ORAL at 22:05

## 2020-03-08 RX ADMIN — TOPIRAMATE 50 MG: 50 TABLET, FILM COATED ORAL at 09:00

## 2020-03-08 RX ADMIN — TOPIRAMATE 50 MG: 50 TABLET, FILM COATED ORAL at 22:05

## 2020-03-08 RX ADMIN — Medication 1 MG: at 22:06

## 2020-03-08 ASSESSMENT — PAIN SCALES - GENERAL
PAINLEVEL_OUTOF10: 2
PAINLEVEL_OUTOF10: 4

## 2020-03-08 NOTE — PLAN OF CARE
Problem: Altered Mood, Depressive Behavior:  Goal: Absence of self-harm  Description: Absence of self-harm  3/8/2020 1030 by Raheel Clark LPN  Outcome: Ongoing  3/8/2020 0138 by Jennifer Omalley RN  Outcome: Ongoing   Patient remains free from self harm contracts for safety. Problem: Altered Mood, Depressive Behavior:  Goal: Able to verbalize and/or display a decrease in depressive symptoms  Description: Able to verbalize and/or display a decrease in depressive symptoms  3/8/2020 1030 by Raheel Clark LPN  Outcome: Ongoing  3/8/2020 0138 by Jennifer Omalley RN  Outcome: Ongoing   Patient verbalizes a decrease in depressive symptoms. Problem: Pain:  Goal: Pain level will decrease  Description: Pain level will decrease  Outcome: Ongoing   Patient's pain level has decreased.

## 2020-03-08 NOTE — PROGRESS NOTES
Department of Psychiatry  Attending Progress Note  Chief Complaint: Schizoaffective disorder ([address]     SUBJECTIVE:  Isabelle Medeiros is seen today in his room. Reports feeling lot better. He is still not attending groups. He states he is sleeping better. He denies AI/HI/AVH. He appears to be responding to internal stimuli. Patient has limited insight and poor judgement. There is no safe alternative at this time than continued hospitalization. OBJECTIVE    Physical  /70   Pulse 70   Temp 97.5 °F (36.4 °C) (Oral)   Resp 16   Ht 5' 8\" (1.727 m)   Wt 262 lb (118.8 kg)   BMI 39.84 kg/m²      Mental Status Evaluation:  Orientation: alertness: alert   Mood:. constricted      Affect:  labile      Appearance:  disheveled   Activity:  Within Normal Limits   Gait/Posture: Normal   Speech:  normal pitch and normal volume   Thought Process:  blocked   Thought Content:  hallucinations   Sensorium:  person, place, time/date and situation   Cognition:  grossly intact   Memory: intact   Insight:  limited   Judgment: limited   Suicidal Ideations: denies suicidal ideation   Homicidal Ideations: Negative for homicidal ideation      Medication Side Effects: absent       Attention Span attention span appeared shorter than expected for age       Labs  No results found for this or any previous visit (from the past 67 hour(s)).     Medications  Current Facility-Administered Medications   Medication Dose Route Frequency Provider Last Rate Last Dose    DULoxetine (CYMBALTA) extended release capsule 30 mg  30 mg Oral Daily Sharyn Setters, APRN - CNP   30 mg at 03/08/20 0900    melatonin ER tablet 1 mg  1 mg Oral Nightly Sharyn Setters, APRN - CNP   1 mg at 03/07/20 2113    prazosin (MINIPRESS) capsule 1 mg  1 mg Oral Nightly Sharyn Setters, APRN - CNP   1 mg at 03/07/20 2113    topiramate (TOPAMAX) tablet 50 mg  50 mg Oral BID Sharyn Setters, APRN - CNP   50 mg at 03/08/20 0900    acetaminophen (TYLENOL) tablet 650 mg  650 mg Oral Q4H PRN Bebeto Bowl, APRN - CNP   650 mg at 03/08/20 1144    traZODone (DESYREL) tablet 50 mg  50 mg Oral Nightly PRN [place] Bowl, APRN - CNP        benztropine mesylate (COGENTIN) injection 2 mg  2 mg Intramuscular BID PRN Bebeto Bowl, APRN - CNP        magnesium hydroxide (MILK OF MAGNESIA) 400 MG/5ML suspension 30 mL  30 mL Oral Daily PRN [place] Bowl, APRN - CNP        aluminum & magnesium hydroxide-simethicone (MAALOX) 200-200-20 MG/5ML suspension 30 mL  30 mL Oral Q6H PRN [place] Bowl, APRN - CNP        hydrOXYzine (ATARAX) tablet 25 mg  25 mg Oral TID PRN [place] Bowl, APRN - CNP   25 mg at 03/07/20 2113    nicotine polacrilex (NICORETTE) gum 2 mg  2 mg Oral PRN Terrence Garcia MD             DULoxetine  30 mg Oral Daily    melatonin ER  1 mg Oral Nightly    prazosin  1 mg Oral Nightly    topiramate  50 mg Oral BID       ASSESSMENT  Schizoaffective disorder (HCC)     Patient's Response to Treatment: positive    PLAN  1. Continue inpatient hospitalization  2. Medication management  3. Participation in groups and therapeutic milieu  4. Social work for discharge planning.        Electronically signed by Jaime Martin MD on 3/8/2020 at 1:57 PM.

## 2020-03-08 NOTE — PLAN OF CARE
Problem: Altered Mood, Depressive Behavior:  Goal: Able to verbalize and/or display a decrease in depressive symptoms  Description: Able to verbalize and/or display a decrease in depressive symptoms  3/8/2020 0138 by Brennan Mcdonald RN  Outcome: Ongoing     Problem: Altered Mood, Depressive Behavior:  Goal: Absence of self-harm  Description: Absence of self-harm  3/8/2020 0138 by Brennan Mcdonald RN  Outcome: Ongoing     Patient is brightened and social in day room. Pt is medication compliant and pleasant during 1:1 talk time with staff. Pt denies thoughts of self harm and is agreeable to seeking out should thoughts of self harm arise. Safe environment maintained. Q15 minute checks for safety cont per unit policy. Will cont to monitor for safety and provide support and reassurance as needed.

## 2020-03-08 NOTE — GROUP NOTE
Group Therapy Note    Date: 3/8/2020    Group Start Time: 1100  Group End Time: 1140  Group Topic: Healthy Living/Wellness    [facility]    Cecilio Haskins RN        Group Therapy Note    Attendees: 7/20         Patient's Goal:  Building self-esteem      Notes:      Status After Intervention:  Improved    Participation Level:  Active Listener    Participation Quality: Appropriate, Sharing and Supportive      Speech:  normal      Thought Process/Content: Logical      Affective Functioning: Congruent      Mood: elevated      Level of consciousness:  Alert, Oriented x4 and Attentive      Response to Learning: Able to verbalize current knowledge/experience and Able to verbalize/acknowledge new learning      Endings: None Reported    Modes of Intervention: Education and Support      Discipline Responsible: Registered Nurse      Signature:  Cecilio Haskins RN

## 2020-03-08 NOTE — GROUP NOTE
Group Therapy Note    Date: 3/8/2020    Group Start Time: 0900  Group End Time: 1100  Group Topic: Community Meeting    MARIA R PEMBERTON    JANIYA Benz    Group Therapy Note    Attendees: 8    Patient's Goal:  Pt will identify one daily goal and verbalize understanding of unit guidelines    Notes:  Pt attended group and participated. Pt is intrusive and monopolizing but able to be redirected. Status After Intervention:  Improved    Participation Level:  Active Listener and Interactive, monopolizing    Participation Quality: Appropriate, Attentive, Sharing and Supportive      Speech:  normal      Thought Process/Content: Logical  Linear      Affective Functioning: Constricted      Mood: euthymic      Level of consciousness:  Alert, Oriented x4 and Attentive      Response to Learning: Able to verbalize current knowledge/experience, Able to verbalize/acknowledge new learning, Able to retain information, Capable of insight, Able to change behavior and Progressing to goal      Endings: None Reported    Modes of Intervention: Education, Support, Socialization, Exploration, Activity and Limit-setting      Discipline Responsible: Psychoeducational Specialist      Signature:  JANIYA Benz

## 2020-03-08 NOTE — GROUP NOTE
Group Therapy Note    Date: 3/8/2020    Group Start Time: 1330  Group End Time: 4516  Group Topic: Psychoeducation    MARIA R BHCHRIS Queen, CANDYS    Group Therapy Note    Attendees: 13    Patient's Goal:  Pt will demonstrate improved interpersonal skills    Notes:  Pt attended group and participated    Status After Intervention:  Improved    Participation Level:  Active Listener and Interactive    Participation Quality: Appropriate, Attentive, Sharing and Supportive      Speech:  normal      Thought Process/Content: Logical  Linear      Affective Functioning: Congruent      Mood: euthymic      Level of consciousness:  Alert, Oriented x4 and Attentive      Response to Learning: Able to verbalize current knowledge/experience, Able to verbalize/acknowledge new learning, Able to retain information, Capable of insight, Able to change behavior and Progressing to goal      Endings: None Reported    Modes of Intervention: Education, Support, Socialization, Exploration, Activity and Media      Discipline Responsible: Psychoeducational Specialist      Signature:  Jenny Kerns, [address]

## 2020-03-08 NOTE — GROUP NOTE
Group Therapy Note    Date: 3/8/2020    Group Start Time: 1000  Group End Time: 4526  Group Topic: Psychoeducation    MARIA R PEMBERTON    SUJIT Viveros LSW        Group Therapy Note    Attendees: 7/20     Patient's Goal:  Increase socialization and coping skills    Notes:  Pt is making progress AEB participating in group activity. Status After Intervention:  Improved    Participation Level:  Active Listener and Interactive    Participation Quality: Appropriate, Attentive and Supportive      Speech:  normal      Thought Process/Content: Logical      Affective Functioning: Congruent      Mood: euthymic      Level of consciousness:  Alert, Oriented x4 and Attentive      Response to Learning: Progressing to goal      Endings: None Reported    Modes of Intervention: Education, Support, Socialization, Exploration, Clarifying and Problem-solving      Discipline Responsible: /Counselor      Signature:  SUJIT Viveros LSW, [place]

## 2020-03-09 VITALS
HEART RATE: 75 BPM | WEIGHT: 262 LBS | HEIGHT: 68 IN | SYSTOLIC BLOOD PRESSURE: 124 MMHG | TEMPERATURE: 98.7 F | BODY MASS INDEX: 39.71 KG/M2 | RESPIRATION RATE: 18 BRPM | DIASTOLIC BLOOD PRESSURE: 80 MMHG

## 2020-03-09 PROCEDURE — 6370000000 HC RX 637 (ALT 250 FOR IP): Performed by: NURSE PRACTITIONER

## 2020-03-09 PROCEDURE — 99238 HOSP IP/OBS DSCHRG MGMT 30/<: CPT | Performed by: PSYCHIATRY & NEUROLOGY

## 2020-03-09 RX ORDER — PRAZOSIN HYDROCHLORIDE 1 MG/1
1 CAPSULE ORAL NIGHTLY
Qty: 30 CAPSULE | Refills: 0 | Status: SHIPPED | OUTPATIENT
Start: 2020-03-09 | End: 2020-04-21 | Stop reason: SDUPTHER

## 2020-03-09 RX ORDER — HYDROXYZINE HYDROCHLORIDE 25 MG/1
25 TABLET, FILM COATED ORAL 3 TIMES DAILY PRN
Qty: 10 TABLET | Refills: 0 | Status: SHIPPED | OUTPATIENT
Start: 2020-03-09 | End: 2020-03-19

## 2020-03-09 RX ORDER — DULOXETIN HYDROCHLORIDE 30 MG/1
30 CAPSULE, DELAYED RELEASE ORAL DAILY
Qty: 30 CAPSULE | Refills: 0 | Status: SHIPPED | OUTPATIENT
Start: 2020-03-09 | End: 2020-04-21 | Stop reason: SDUPTHER

## 2020-03-09 RX ADMIN — TOPIRAMATE 50 MG: 50 TABLET, FILM COATED ORAL at 09:28

## 2020-03-09 RX ADMIN — DULOXETINE HYDROCHLORIDE 30 MG: 30 CAPSULE, DELAYED RELEASE ORAL at 09:28

## 2020-03-09 NOTE — PLAN OF CARE
Problem: Altered Mood, Depressive Behavior:  Goal: Able to verbalize and/or display a decrease in depressive symptoms  Description: Able to verbalize and/or display a decrease in depressive symptoms  3/8/2020 2109 by Bri Pedro RN  Outcome: Ongoing     Problem: Altered Mood, Depressive Behavior:  Goal: Absence of self-harm  Description: Absence of self-harm  3/8/2020 2109 by Bri Pedro RN  Outcome: Ongoing     Patient is brightened and social in day room. Pt is medication compliant and pleasant during 1:1 talk time with staff. Pt denies thoughts of self harm and is agreeable to seeking out should thoughts of self harm arise. Safe environment maintained. Q15 minute checks for safety cont per unit policy. Will cont to monitor for safety and provide support and reassurance as needed.

## 2020-03-09 NOTE — BH NOTE
[facility]  Discharge Note    Pt discharged with followings belongings:   Dentures: None  Vision - Corrective Lenses: Glasses  Hearing Aid: None  Jewelry: None  Body Piercings Removed: N/A  Clothing: Jacket / coat, Pants, Shirt, Footwear, Socks  Were All Patient Medications Collected?: Not Applicable  Other Valuables: Cell phone, Computer, Money (Comment)($1.00)   Valuables sent home with patient at the time of discharge. Valuables retrieved from safe, Security envelope number:  [id number] and returned to patient. Patient education on aftercare instructions: Given and patient states understanding  Information faxed to [facility] by Nurse Patient verbalize understanding of AVS:  Yes. Patient was ambulatory at time of discharge with belongings in hand. Patient was picked up by a cab and taken [place] a [place] residence.      Status EXAM upon discharge:  Status and Exam  Normal: Yes  Facial Expression: Brightened  Affect: Appropriate, Congruent  Level of Consciousness: Alert  Mood:Normal: Yes  Mood: Depressed, Anxious  Motor Activity:Normal: Yes  Interview Behavior: Cooperative  Preception: [place] to Person, Vickie Heck to Time, [place] to Place, [place] to Situation  Attention:Normal: Yes  Attention: Distractible  Thought Processes: Circumstantial  Thought Content:Normal: Yes  Thought Content: Poverty of Content  Hallucinations: None  Delusions: No  Memory:Normal: Yes  Insight and Judgment: No  Insight and Judgment: Poor Judgment  Present Suicidal Ideation: No  Present Homicidal Ideation: No      Metabolic Screening:    No results found for: LABA1C    No results found for: CHOL  No results found for: TRIG  No results found for: HDL  No components found for: LDLCAL  No results found for: Sara Ty LPN

## 2020-03-09 NOTE — FLOWSHEET NOTE
*Patient participated in the Spirituality Group       03/09/20 1216   Encounter Summary   Services provided to: Patient   Referral/Consult From: Rounding   Continue Visiting   (3/9/20)   Complexity of Encounter Moderate   Length of Encounter 30 minutes   Spiritual Assessment Completed Yes   Spiritual/[place]   Type Spiritual support   Assessment Calm; Approachable   Intervention Active listening   Outcome Receptive

## 2020-03-09 NOTE — GROUP NOTE
Group Therapy Note    Date: 3/9/2020    Group Start Time: 1000  Group End Time: 6394  Group Topic: Healthy Living/Wellness    [facility] JANIYA Mckeon        Group Therapy Note    Attendees: 5/11         Patient's Goal: to increase self esteem, positive coping skills      Status After Intervention:  Improved    Participation Level:  Active Listener and Interactive    Participation Quality: Appropriate and Attentive      Speech:  normal      Thought Process/Content: Logical  Linear      Affective Functioning: Congruent    Mood: euphoric      Level of consciousness:  Alert and Oriented x4    Response to Learning: Able to verbalize current knowledge/experience, Able to verbalize/acknowledge new learning and Able to retain information    Modes of Intervention: Education, Support and Exploration      Discipline Responsible: Psychoeducational Specialist      Signature:  Tricia Gore

## 2020-03-09 NOTE — GROUP NOTE
Group Therapy Note    Date: 3/8/2020    Group Start Time: 2030  Group End Time: 2100  Group Topic: Wrap-Up    [facility]    Nabil Delaney, RN        Group Therapy Note    Attendees: 15           Discipline Responsible:   OT  AT   x Ns.  RT  Other       Participation Level:     None  Minimal   x Active Listener x Interactive    Monopolizing         Participation Quality:  x Appropriate  Inappropriate   x       Attentive        Intrusive   x       Sharing        Resistant   x       Supportive        Lethargic       Affective:   x Congruent  Incongruent  Blunted  Flat    Constricted  Anxious  Elated  Angry    Labile  Depressed  Other         Cognitive:  x Alert  Oriented PPTP     Concentration x G  F  P   Attention Span x G  F  P   Short-Term Memory x G  F  P   Long-Term Memory x G  F  P   ProblemSolving/  Decision Making x G  F  P   Ability to Process  Information x G  F  P      Contributing Factors             Delusional             Hallucinating             Flight of Ideas             Other:       Modes of Intervention:  x Education  Support  Exploration   x Clarifying  Problem Solving  Confrontation    Socialization  Limit Setting  Reality Testing    Activity  Movement  Media    Other:            Response to Learning:  x Able to verbalize current knowledge/experience   x Able to verbalize/acknowledge new learning    Able to retain information    Capable of insight    Able to change behavior    Progressing to goal    Other:        Comments:

## 2020-03-09 NOTE — DISCHARGE INSTR - PHARMACY
hydroxyzine  Pronunciation:  michael DROX ee zeen  Brand:  Vistaril  What is the most important information I should know about hydroxyzine? You should not use hydroxyzine if you are pregnant, especially during the first or second trimester. Hydroxyzine can cause a serious heart problem, especially if you use certain medicines at the same time. Tell your doctor about all your current medicines and any you start or stop using. What is hydroxyzine? Hydroxyzine reduces activity in the central nervous system. It also acts as an antihistamine that reduces the effects of natural chemical histamine in the body. Histamine can produce symptoms of itching, or hives on the skin. Hydroxyzine is used as a sedative to treat anxiety and tension. It is also used together with other medications given during and after general anesthesia. Hydroxyzine is also used to treat allergic skin reactions such as hives or contact dermatitis. Hydroxyzine may also be used for purposes not listed in this medication guide. What should I discuss with my healthcare provider before taking hydroxyzine? You should not use hydroxyzine if you are allergic to it, or if:  · you have long QT syndrome;  · you are allergic to cetirizine (Zyrtec) or levocetirizine (Xyzal); or  · you are in the first trimester of pregnancy. You should not use hydroxyzine if you are pregnant, especially during the first or second trimester. Hydroxyzine could harm the unborn baby or cause birth defects. Use effective birth control to prevent pregnancy while you are using this medicine.   To make sure hydroxyzine is safe for you, tell your doctor if you have:  · blockage in your digestive tract (stomach or intestines);  · bladder obstruction or other urination problems;  · glaucoma;  · heart disease, slow heartbeats;  · personal or family history of long QT syndrome;  · an electrolyte imbalance (such as high or low levels of potassium in your blood);  · if you have recently had a heart attack. It is not known whether hydroxyzine passes into breast milk or if it could harm a nursing baby. You should not breast-feed while using this medicine. Do not give this medicine to a child without medical advice. How should I take hydroxyzine? Follow all directions on your prescription label. Your doctor may occasionally change your dose. Do not use this medicine in larger or smaller amounts or for longer than recommended. Shake the oral suspension (liquid) well just before you measure a dose. Measure liquid medicine with the dosing syringe provided, or with a special dose-measuring spoon or medicine cup. If you do not have a dose-measuring device, ask your pharmacist for one. Hydroxyzine is for short-term use only. You should not take this medicine for longer than 4 months. Call your doctor if your anxiety symptoms do not improve, or if they get worse. Store at room temperature away from moisture and heat. What happens if I miss a dose? Take the missed dose as soon as you remember. Skip the missed dose if it is almost time for your next scheduled dose. Do not take extra medicine to make up the missed dose. What happens if I overdose? Seek emergency medical attention or call the Poison Help line at 1-965.843.8433. Overdose symptoms may include severe drowsiness, nausea, vomiting, uncontrolled muscle movements, or seizure (convulsions). What should I avoid while taking hydroxyzine? This medicine may impair your thinking or reactions. Be careful if you drive or do anything that requires you to be alert. Drinking alcohol with this medicine can cause side effects. What are the possible side effects of hydroxyzine? Get emergency medical help if you have signs of an allergic reaction:  hives; difficult breathing; swelling of your face, lips, tongue, or throat. In rare cases, hydroxyzine may cause a severe skin reaction.  Stop taking this medicine and call your doctor right away if you have sudden skin redness or a rash that spreads and causes white or yellow pustules, blistering, or peeling. Stop using hydroxyzine and call your doctor at once if you have:  · fast or pounding heartbeats;  · headache with chest pain;  · severe dizziness, fainting; or  · a seizure (convulsions). Side effects such as drowsiness and confusion may be more likely in older adults. Common side effects may include:  · drowsiness;  · headache;  · dry mouth; or  · skin rash. This is not a complete list of side effects and others may occur. Call your doctor for medical advice about side effects. You may report side effects to FDA at 5-667-IDN-7212. What other drugs will affect hydroxyzine? Taking this medicine with other drugs that make you sleepy can worsen this effect. Ask your doctor before taking hydroxyzine with a sleeping pill, narcotic pain medicine, muscle relaxer, or medicine for anxiety, depression, or seizures. Hydroxyzine can cause a serious heart problem, especially if you use certain medicines at the same time, including antibiotics, antidepressants, heart rhythm medicine, antipsychotic medicines, and medicines to treat cancer, malaria, HIV or AIDS. Tell your doctor about all medicines you use, and those you start or stop using during your treatment with hydroxyzine. Other drugs may interact with hydroxyzine, including prescription and over-the-counter medicines, vitamins, and herbal products. Not all possible interactions are listed here. Tell each of your health care providers about all medicines you use now and any medicine you start or stop using. Where can I get more information? Your pharmacist can provide more information about hydroxyzine. Remember, keep this and all other medicines out of the reach of children, never share your medicines with others, and use this medication only for the indication prescribed.   Every effort has been made to ensure that the information provided by

## 2020-03-09 NOTE — GROUP NOTE
Group Therapy Note    Date: 3/9/2020    Group Start Time: 1100  Group End Time: 9893  Group Topic: Psychotherapy    MARIA R Malloy, MARS        Group Therapy Note    Patient declined to attend Psychotherapy group at 1100 am despite encouragement. Patient was offered a 1:1 time to meet after group or alternative activity to do and patient refused.      Signature:  Delores Malloy [facility], [facility], LPC
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
## 2020-04-21 ENCOUNTER — HOSPITAL ENCOUNTER (EMERGENCY)
Age: 25
Discharge: HOME | End: 2020-04-21
Attending: EMERGENCY MEDICINE
Payer: MEDICAID

## 2020-04-21 VITALS
RESPIRATION RATE: 18 BRPM | TEMPERATURE: 98.6 F | SYSTOLIC BLOOD PRESSURE: 143 MMHG | HEIGHT: 68 IN | DIASTOLIC BLOOD PRESSURE: 83 MMHG | OXYGEN SATURATION: 100 % | HEART RATE: 87 BPM | BODY MASS INDEX: 40.16 KG/M2 | WEIGHT: 265 LBS

## 2020-04-21 PROCEDURE — 99281 EMR DPT VST MAYX REQ PHY/QHP: CPT

## 2020-04-21 RX ORDER — DULOXETIN HYDROCHLORIDE 30 MG/1
30 CAPSULE, DELAYED RELEASE ORAL DAILY
Qty: 30 CAPSULE | Refills: 0 | Status: ON HOLD | OUTPATIENT
Start: 2020-04-21 | End: 2020-06-22 | Stop reason: HOSPADM

## 2020-04-21 RX ORDER — TOPIRAMATE 50 MG/1
50 TABLET, FILM COATED ORAL 2 TIMES DAILY
Qty: 30 TABLET | Refills: 0 | Status: ON HOLD | OUTPATIENT
Start: 2020-04-21 | End: 2020-06-22 | Stop reason: HOSPADM

## 2020-04-21 RX ORDER — PRAZOSIN HYDROCHLORIDE 1 MG/1
1 CAPSULE ORAL NIGHTLY
Qty: 30 CAPSULE | Refills: 0 | Status: ON HOLD | OUTPATIENT
Start: 2020-04-21 | End: 2020-06-22 | Stop reason: SDUPTHER

## 2020-04-21 RX ORDER — UREA 10 %
1 LOTION (ML) TOPICAL NIGHTLY
Qty: 30 TABLET | Refills: 0 | Status: ON HOLD | OUTPATIENT
Start: 2020-04-21 | End: 2020-06-22 | Stop reason: HOSPADM

## 2020-04-21 ASSESSMENT — ENCOUNTER SYMPTOMS
COUGH: 0
VOMITING: 0
SHORTNESS OF BREATH: 0
SORE THROAT: 0
NAUSEA: 0
PHOTOPHOBIA: 0
ABDOMINAL PAIN: 0

## 2020-04-21 NOTE — ED PROVIDER NOTES
[facility]     Emergency Department     Faculty Attestation    I performed a history and physical examination of the patient and discussed management with the resident. I reviewed the resident´s note and agree with the documented findings and plan of care. Any areas of disagreement are noted on the chart. I was personally present for the key portions of any procedures. I have documented in the chart those procedures where I was not present during the key portions. I have reviewed the emergency nurses triage note. I agree with the chief complaint, past medical history, past surgical history, allergies, medications, social and family history as documented unless otherwise noted below. For Physician Assistant/ Nurse Practitioner cases/documentation I have personally evaluated this patient and have completed at least one if not all key elements of the E/M (history, physical exam, and MDM). Additional findings are as noted. Awake alert and oriented, not clinically intoxicated, skin warm and dry, no ataxia or nystagmus, no muscle rigidity, cranial nerves intact, good airway, no meningeal signs.   Chest clear, heart exam normal.       Hiram Gillis MD  04/21/20 1728
Food insecurity     Worry: Not on file     Inability: Not on file    Transportation needs     Medical: Not on file     Non-medical: Not on file   Tobacco Use    Smoking status: Former Smoker     Packs/day: 0.50     Years: 9.00     Pack years: 4.50    Smokeless tobacco: Never Used   Substance and Sexual Activity    Alcohol use: Yes     Comment: Heavy, stopped     Drug use: Yes     Types: Marijuana     Comment: K2    Sexual activity: Not Currently   Lifestyle    Physical activity     Days per week: Not on file     Minutes per session: Not on file    Stress: Not on file   Relationships    Social connections     Talks on phone: Not on file     Gets together: Not on file     Attends Judaism service: Not on file     Active member of club or organization: Not on file     Attends meetings of clubs or organizations: Not on file     Relationship status: Not on file    Intimate partner violence     Fear of current or ex partner: Not on file     Emotionally abused: Not on file     Physically abused: Not on file     Forced sexual activity: Not on file   Other Topics Concern    Not on file   Social History Narrative    Not on file       Family History   Problem Relation Age of Onset    Coronary Art Dis Mother          of [facility] Alcohol Abuse Mother     Mental Illness Brother     Alcohol Abuse Other         Many on the maternal side    Coronary Art Dis Other         Maternal aunts       Allergies:  Haldol [haloperidol lactate]    Home Medications:  Prior to Admission medications    Medication Sig Start Date End Date Taking?  Authorizing Provider   DULoxetine (CYMBALTA) 30 MG extended release capsule Take 1 capsule by mouth daily 20  Yes Aftab Greene, DO   melatonin 1 MG tablet Take 1 tablet by mouth nightly 20  Yes Aftab Greene, DO   prazosin (MINIPRESS) 1 MG capsule Take 1 capsule by mouth nightly 20  Yes Aftab Greene, DO   topiramate (TOPAMAX) 50 MG tablet Take 1 tablet by mouth 2

## 2020-04-21 NOTE — ED NOTES
Pt to the ED for medication refill. Pt states that he has been living in [place] when he is from [place]. Pt states that he has been trying to get into the homeless shelter for 1.5 weeks. Pt states that he has been out of his home medications (melatonin, cymbalta, minipres, and topamax) for 1.5 weeks. Pt denies pain. Pt denies SI or HI today.       Tk Gan RN  04/21/20 5584
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
## 2020-06-20 ENCOUNTER — HOSPITAL ENCOUNTER (INPATIENT)
Age: 25
LOS: 2 days | Discharge: HOME | DRG: 750 | End: 2020-06-22
Attending: PSYCHIATRY & NEUROLOGY | Admitting: PSYCHIATRY & NEUROLOGY
Payer: MEDICAID

## 2020-06-20 PROBLEM — F33.9 MAJOR DEPRESSION, RECURRENT (HCC): Status: ACTIVE | Noted: 2020-06-20

## 2020-06-20 PROCEDURE — 1240000000 HC EMOTIONAL WELLNESS R&B

## 2020-06-20 RX ORDER — HYDROXYZINE HYDROCHLORIDE 25 MG/1
25 TABLET, FILM COATED ORAL 3 TIMES DAILY PRN
Status: DISCONTINUED | OUTPATIENT
Start: 2020-06-20 | End: 2020-06-22 | Stop reason: HOSPADM

## 2020-06-20 RX ORDER — MAGNESIUM HYDROXIDE/ALUMINUM HYDROXICE/SIMETHICONE 120; 1200; 1200 MG/30ML; MG/30ML; MG/30ML
30 SUSPENSION ORAL EVERY 6 HOURS PRN
Status: DISCONTINUED | OUTPATIENT
Start: 2020-06-20 | End: 2020-06-22 | Stop reason: HOSPADM

## 2020-06-20 RX ORDER — ACETAMINOPHEN 325 MG/1
650 TABLET ORAL EVERY 4 HOURS PRN
Status: DISCONTINUED | OUTPATIENT
Start: 2020-06-20 | End: 2020-06-22 | Stop reason: HOSPADM

## 2020-06-20 RX ORDER — TRAZODONE HYDROCHLORIDE 50 MG/1
50 TABLET ORAL NIGHTLY PRN
Status: DISCONTINUED | OUTPATIENT
Start: 2020-06-20 | End: 2020-06-22 | Stop reason: HOSPADM

## 2020-06-20 ASSESSMENT — PAIN SCALES - GENERAL: PAINLEVEL_OUTOF10: 0

## 2020-06-20 ASSESSMENT — SLEEP AND FATIGUE QUESTIONNAIRES
DO YOU USE A SLEEP AID: NO
DO YOU HAVE DIFFICULTY SLEEPING: NO
AVERAGE NUMBER OF SLEEP HOURS: 6

## 2020-06-20 ASSESSMENT — LIFESTYLE VARIABLES: HISTORY_ALCOHOL_USE: YES

## 2020-06-20 ASSESSMENT — PATIENT HEALTH QUESTIONNAIRE - PHQ9: SUM OF ALL RESPONSES TO PHQ QUESTIONS 1-9: 5

## 2020-06-21 LAB
ABSOLUTE EOS #: 0.1 K/UL (ref 0–0.4)
ABSOLUTE IMMATURE GRANULOCYTE: ABNORMAL K/UL (ref 0–0.3)
ABSOLUTE LYMPH #: 1.2 K/UL (ref 1–4.8)
ABSOLUTE MONO #: 0.4 K/UL (ref 0.1–1.3)
ALBUMIN SERPL-MCNC: 4.2 G/DL (ref 3.5–5.2)
ALBUMIN/GLOBULIN RATIO: ABNORMAL (ref 1–2.5)
ALP BLD-CCNC: 124 U/L (ref 40–129)
ALT SERPL-CCNC: 98 U/L (ref 5–41)
ANION GAP SERPL CALCULATED.3IONS-SCNC: 10 MMOL/L (ref 9–17)
AST SERPL-CCNC: 42 U/L
BASOPHILS # BLD: 0 % (ref 0–2)
BASOPHILS ABSOLUTE: 0 K/UL (ref 0–0.2)
BILIRUB SERPL-MCNC: 0.32 MG/DL (ref 0.3–1.2)
BUN BLDV-MCNC: 8 MG/DL (ref 6–20)
BUN/CREAT BLD: ABNORMAL (ref 9–20)
CALCIUM SERPL-MCNC: 9.6 MG/DL (ref 8.6–10.4)
CHLORIDE BLD-SCNC: 102 MMOL/L (ref 98–107)
CHOLESTEROL/HDL RATIO: 4
CHOLESTEROL: 174 MG/DL
CO2: 25 MMOL/L (ref 20–31)
CREAT SERPL-MCNC: 0.82 MG/DL (ref 0.7–1.2)
DIFFERENTIAL TYPE: ABNORMAL
EOSINOPHILS RELATIVE PERCENT: 2 % (ref 0–4)
ESTIMATED AVERAGE GLUCOSE: 123 MG/DL
GFR AFRICAN AMERICAN: >60 ML/MIN
GFR NON-AFRICAN AMERICAN: >60 ML/MIN
GFR SERPL CREATININE-BSD FRML MDRD: ABNORMAL ML/MIN/{1.73_M2}
GFR SERPL CREATININE-BSD FRML MDRD: ABNORMAL ML/MIN/{1.73_M2}
GLUCOSE BLD-MCNC: 92 MG/DL (ref 70–99)
HBA1C MFR BLD: 5.9 % (ref 4–6)
HCT VFR BLD CALC: 45.6 % (ref 41–53)
HDLC SERPL-MCNC: 44 MG/DL
HEMOGLOBIN: 14.9 G/DL (ref 13.5–17.5)
IMMATURE GRANULOCYTES: ABNORMAL %
LDL CHOLESTEROL: 103 MG/DL (ref 0–130)
LYMPHOCYTES # BLD: 32 % (ref 24–44)
MCH RBC QN AUTO: 26.5 PG (ref 26–34)
MCHC RBC AUTO-ENTMCNC: 32.8 G/DL (ref 31–37)
MCV RBC AUTO: 80.7 FL (ref 80–100)
MONOCYTES # BLD: 11 % (ref 1–7)
NRBC AUTOMATED: ABNORMAL PER 100 WBC
PDW BLD-RTO: 15.5 % (ref 11.5–14.9)
PLATELET # BLD: 189 K/UL (ref 150–450)
PLATELET ESTIMATE: ABNORMAL
PMV BLD AUTO: 10.1 FL (ref 6–12)
POTASSIUM SERPL-SCNC: 4.5 MMOL/L (ref 3.7–5.3)
RBC # BLD: 5.64 M/UL (ref 4.5–5.9)
RBC # BLD: ABNORMAL 10*6/UL
SEG NEUTROPHILS: 55 % (ref 36–66)
SEGMENTED NEUTROPHILS ABSOLUTE COUNT: 2.1 K/UL (ref 1.3–9.1)
SODIUM BLD-SCNC: 137 MMOL/L (ref 135–144)
THYROXINE, FREE: 1.05 NG/DL (ref 0.93–1.7)
TOTAL PROTEIN: 6.7 G/DL (ref 6.4–8.3)
TRIGL SERPL-MCNC: 133 MG/DL
TSH SERPL DL<=0.05 MIU/L-ACNC: 0.98 MIU/L (ref 0.3–5)
VLDLC SERPL CALC-MCNC: NORMAL MG/DL (ref 1–30)
WBC # BLD: 3.8 K/UL (ref 3.5–11)
WBC # BLD: ABNORMAL 10*3/UL

## 2020-06-21 PROCEDURE — 84443 ASSAY THYROID STIM HORMONE: CPT

## 2020-06-21 PROCEDURE — 90792 PSYCH DIAG EVAL W/MED SRVCS: CPT | Performed by: NURSE PRACTITIONER

## 2020-06-21 PROCEDURE — 80053 COMPREHEN METABOLIC PANEL: CPT

## 2020-06-21 PROCEDURE — 80061 LIPID PANEL: CPT

## 2020-06-21 PROCEDURE — 85025 COMPLETE CBC W/AUTO DIFF WBC: CPT

## 2020-06-21 PROCEDURE — 6370000000 HC RX 637 (ALT 250 FOR IP): Performed by: NURSE PRACTITIONER

## 2020-06-21 PROCEDURE — 83036 HEMOGLOBIN GLYCOSYLATED A1C: CPT

## 2020-06-21 PROCEDURE — 84439 ASSAY OF FREE THYROXINE: CPT

## 2020-06-21 PROCEDURE — 1240000000 HC EMOTIONAL WELLNESS R&B

## 2020-06-21 PROCEDURE — 36415 COLL VENOUS BLD VENIPUNCTURE: CPT

## 2020-06-21 RX ORDER — DULOXETIN HYDROCHLORIDE 30 MG/1
30 CAPSULE, DELAYED RELEASE ORAL DAILY
Status: DISCONTINUED | OUTPATIENT
Start: 2020-06-21 | End: 2020-06-22 | Stop reason: HOSPADM

## 2020-06-21 RX ORDER — PRAZOSIN HYDROCHLORIDE 1 MG/1
1 CAPSULE ORAL NIGHTLY
Status: DISCONTINUED | OUTPATIENT
Start: 2020-06-21 | End: 2020-06-22 | Stop reason: HOSPADM

## 2020-06-21 RX ORDER — TOPIRAMATE 50 MG/1
50 TABLET, FILM COATED ORAL 2 TIMES DAILY
Status: DISCONTINUED | OUTPATIENT
Start: 2020-06-21 | End: 2020-06-22 | Stop reason: HOSPADM

## 2020-06-21 RX ADMIN — Medication 1 MG: at 21:10

## 2020-06-21 RX ADMIN — DULOXETINE HYDROCHLORIDE 30 MG: 30 CAPSULE, DELAYED RELEASE ORAL at 16:43

## 2020-06-21 RX ADMIN — PRAZOSIN HYDROCHLORIDE 1 MG: 1 CAPSULE ORAL at 21:10

## 2020-06-21 ASSESSMENT — LIFESTYLE VARIABLES: HISTORY_ALCOHOL_USE: YES
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
## 2020-06-21 NOTE — BH NOTE
Patient given tobacco quitline number 20772367684 at this time, refusing to call at this time, states \" I just dont want to quit now\"- patient given information as to the dangers of long term tobacco use. Continue to reinforce the importance of tobacco cessation.
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
## 2020-06-22 VITALS
RESPIRATION RATE: 14 BRPM | WEIGHT: 260 LBS | HEART RATE: 61 BPM | DIASTOLIC BLOOD PRESSURE: 72 MMHG | SYSTOLIC BLOOD PRESSURE: 138 MMHG | TEMPERATURE: 97.7 F | BODY MASS INDEX: 39.4 KG/M2 | HEIGHT: 68 IN

## 2020-06-22 PROCEDURE — 6370000000 HC RX 637 (ALT 250 FOR IP): Performed by: NURSE PRACTITIONER

## 2020-06-22 PROCEDURE — 99239 HOSP IP/OBS DSCHRG MGMT >30: CPT | Performed by: NURSE PRACTITIONER

## 2020-06-22 RX ORDER — DULOXETIN HYDROCHLORIDE 30 MG/1
30 CAPSULE, DELAYED RELEASE ORAL DAILY
Qty: 14 CAPSULE | Refills: 0 | Status: SHIPPED | OUTPATIENT
Start: 2020-06-23 | End: 2020-07-07

## 2020-06-22 RX ORDER — TOPIRAMATE 50 MG/1
50 TABLET, FILM COATED ORAL 2 TIMES DAILY
Qty: 28 TABLET | Refills: 0 | Status: SHIPPED | OUTPATIENT
Start: 2020-06-22 | End: 2020-07-06

## 2020-06-22 RX ORDER — PRAZOSIN HYDROCHLORIDE 1 MG/1
1 CAPSULE ORAL NIGHTLY
Qty: 14 CAPSULE | Refills: 0 | Status: SHIPPED | OUTPATIENT
Start: 2020-06-22

## 2020-06-22 RX ADMIN — DULOXETINE HYDROCHLORIDE 30 MG: 30 CAPSULE, DELAYED RELEASE ORAL at 08:48

## 2020-06-22 RX ADMIN — TOPIRAMATE 50 MG: 50 TABLET, FILM COATED ORAL at 09:30

## 2020-06-22 RX ADMIN — ACETAMINOPHEN 650 MG: 325 TABLET, FILM COATED ORAL at 06:08

## 2020-06-22 ASSESSMENT — PAIN DESCRIPTION - PAIN TYPE: TYPE: ACUTE PAIN

## 2020-06-22 ASSESSMENT — PAIN DESCRIPTION - LOCATION: LOCATION: HEAD

## 2020-06-22 ASSESSMENT — PAIN DESCRIPTION - DESCRIPTORS: DESCRIPTORS: HEADACHE

## 2020-06-22 ASSESSMENT — PAIN SCALES - GENERAL: PAINLEVEL_OUTOF10: 3

## 2020-06-22 NOTE — GROUP NOTE
Group Therapy Note    Date: 6/22/2020    Group Start Time: 0130  Group End Time: 0300  Group Topic: Psychotherapy    [facility]    SUJIT Garcia LSW        Group Therapy Note    1:1 provided as an alternative to group         Patient's Goal:  Pt will improve mental health goals. Notes:  Discussed discharge planning with pt.      Status After Intervention:  Improved    Participation Level: Interactive    Participation Quality: Appropriate      Speech:  normal      Thought Process/Content: Logical      Affective Functioning: Congruent      Mood: elevated      Level of consciousness:  Alert      Response to Learning: Progressing to goal      Endings: None Reported    Modes of Intervention: Support      Discipline Responsible: /Counselor      Signature:  SUJIT Garcia, REED

## 2020-06-22 NOTE — DISCHARGE SUMMARY
delusions. Denies SE's from meds. It was decided that pt had achieved maximum benefit from hospital care and can be discharged     Consults: Internal medicine     Significant Diagnostic Studies: Routine labs and diagnostics    Treatments: Psychotropic medications, therapy with group, milieu, and 1:1 with nurses, social workers, PAGladisC/CNP, and Attending physician. Discharge Medications:  Current Discharge Medication List      START taking these medications    Details   melatonin ER 1 MG TBCR tablet Take 1 tablet by mouth nightly  Qty: 14 tablet, Refills: 0         CONTINUE these medications which have CHANGED    Details   topiramate (TOPAMAX) 50 MG tablet Take 1 tablet by mouth 2 times daily for 14 days  Qty: 28 tablet, Refills: 0      DULoxetine (CYMBALTA) 30 MG extended release capsule Take 1 capsule by mouth daily for 14 days  Qty: 14 capsule, Refills: 0      prazosin (MINIPRESS) 1 MG capsule Take 1 capsule by mouth nightly  Qty: 14 capsule, Refills: 0         STOP taking these medications       melatonin 1 MG tablet Comments:   Reason for Stopping:                Core Measures statement:   Not applicable    Discharge Exam:  Level of consciousness:  Within normal limits  Appearance: Street clothes, seated, with good grooming  Behavior/Motor: No abnormalities noted  Attitude toward examiner:  Cooperative, attentive, good eye contact  Speech:  spontaneous, normal rate, normal volume and well articulated  Mood:  euthymic  Affect:  mood congruent  Thought processes:  linear, goal directed and coherent  Thought content:  Homocidal ideation denies  Suicidal Ideation:  denies suicidal ideation  Delusions:  no evidence of delusions  Perceptual Disturbance:  denies any perceptual disturbance  Cognition:  In tact  Memory: age appropriate  Insight & Judgement: fair  Medication side effects:  denies     Disposition: home    Patient Instructions:    Activity: activity as tolerated    Follow-up as scheduled with outpatient

## 2020-06-26 NOTE — H&P
and appropriately reactive Thought process was linear and well-organized. Patient denied any hallucinations or paranoia. Patient denied suicidal ideations. Patient denied homicidal ideations . Patient's gross cognitive functions were intact. Insight and judgement were poor. Both recent and remote memory were intact. Psychomotor status was without abnormality     Diagnostic Impression  Principal Problem:    Schizoaffective disorder ([address]  Resolved Problems:    * No resolved hospital problems. *        Lab Review  No results found for this or any previous visit (from the past 72 hour(s)). Medications          Treatment Plan:    · Admit to inpatient psychiatric treatment  · Supportive therapy with medication management. Reviewed risks and benefits as well as potential side effects with patient. · Therapeutic activities and groups  · Follow up at [facility] after symptoms stabilized  · Discharge planning with social work. · Reviewed and restarted home medications: melatonin, prazosin, Topamax, Cymbalta   · Verify date of pt's last Abilify Maintena injection on Monday with [facility]     Estimated length of stay: 3-5 days    Jesse Murrieta MD    Psychiatric Nurse Practitioner    06/25/20   9:09 PM    Dragon voice recognition software used in portions of this document. Occasionally words are mis-transcribed    Patient Location:  [place] [id number]    Provider Location (City/[facility]):   [place], [place]    This virtual visit was conducted via interactive/real-time audio/video.